# Patient Record
Sex: FEMALE | Race: BLACK OR AFRICAN AMERICAN | NOT HISPANIC OR LATINO | Employment: PART TIME | URBAN - METROPOLITAN AREA
[De-identification: names, ages, dates, MRNs, and addresses within clinical notes are randomized per-mention and may not be internally consistent; named-entity substitution may affect disease eponyms.]

---

## 2017-05-12 ENCOUNTER — OFFICE VISIT (OUTPATIENT)
Dept: OPHTHALMOLOGY | Facility: CLINIC | Age: 32
End: 2017-05-12

## 2017-05-12 DIAGNOSIS — H52.229 REGULAR ASTIGMATISM, UNSPECIFIED EYE: ICD-10-CM

## 2017-05-12 DIAGNOSIS — H53.8 BLURRED VISION, BILATERAL: ICD-10-CM

## 2017-05-12 DIAGNOSIS — H04.123 DRY EYES, BILATERAL: ICD-10-CM

## 2017-05-12 DIAGNOSIS — H52.13 MYOPIA, BILATERAL: Primary | ICD-10-CM

## 2017-05-12 ASSESSMENT — VISUAL ACUITY
OS_SC: 20/150
OS_PH_SC: 20/40
OD_SC: 20/500
METHOD: SNELLEN - LINEAR
OD_PH_SC: 20/70

## 2017-05-12 ASSESSMENT — REFRACTION_MANIFEST
OD_CYLINDER: +3.00
OD_CYLINDER: +3.25
OD_CYLINDER: +1.00
OS_CYLINDER: +1.75
OS_AXIS: 000
OD_SPHERE: -7.00
OS_SPHERE: -3.50
OS_AXIS: 090
OS_SPHERE: -3.00
OD_AXIS: 095
OD_SPHERE: -7.50
OD_AXIS: 105
OD_AXIS: 085
OD_SPHERE: -5.75
OS_AXIS: 080
OS_SPHERE: -3.75
OS_CYLINDER: +1.50
OS_CYLINDER: +0.00

## 2017-05-12 ASSESSMENT — SLIT LAMP EXAM - LIDS
COMMENTS: NORMAL
COMMENTS: NORMAL

## 2017-05-12 ASSESSMENT — CONF VISUAL FIELD
OD_NORMAL: 1
OS_NORMAL: 1

## 2017-05-12 ASSESSMENT — TONOMETRY
IOP_METHOD: TONOPEN
OS_IOP_MMHG: 13
OD_IOP_MMHG: 15

## 2017-05-12 ASSESSMENT — EXTERNAL EXAM - LEFT EYE: OS_EXAM: NORMAL

## 2017-05-12 ASSESSMENT — EXTERNAL EXAM - RIGHT EYE: OD_EXAM: NORMAL

## 2017-05-12 NOTE — MR AVS SNAPSHOT
After Visit Summary   5/12/2017    Indy Erazo    MRN: [id number]           Patient Information     Date Of Birth          1985        Visit Information        Provider Department      5/12/2017 10:00 AM Raysa Vasquez MD [facility]        Today's Diagnoses     Myopia, bilateral    -  1    Regular astigmatism, unspecified eye        Dry eyes, bilateral        Blurred vision, bilateral          Care Instructions    Use artificial tear drops at least three times per day in both eyes for dry eye pain.        Follow-ups after your visit        Your next 10 appointments already scheduled     Jul 18, 2017  9:00 AM CDT   Return Visit with Raysa Vasquez MD   [facility] ([facility])    [facility]  [address] [address] [phone]              Who to contact     Please call your clinic at [phone] to:    Ask questions about your health    Make or cancel appointments    Discuss your medicines    Learn about your test results    Speak to your doctor   If you have compliments or concerns about an experience at your clinic, or if you wish to file a complaint, please contact [facility] Patient Relations at [phone] or email us at [email or web address]         Additional Information About Your Visit        MyChart Information     [employer] is an electronic gateway that provides easy, online access to your medical records. With [employer] Software, you can request a clinic appointment, read your test results, renew a prescription or communicate with your care team.     To sign up for [employer] Gr[employer] visit the website at [email or web address]   You will be asked to enter the access code listed below, as well as some personal information. Please follow the directions to create your username and password.     Your access code is:  [email or web address]  Expires: 8/10/2017 11:53 AM     Your access code will  in 90 days. If you need help or a new code, please contact your [facility] or call [phone] for assistance.        Care EveryWhere ID     This is your Care EveryWhere ID. This could be used by other organizations to access your [facility] medical records  [id number]         Blood Pressure from Last 3 Encounters:   No data found for BP    Weight from Last 3 Encounters:   No data found for Wt              Today, you had the following     No orders found for display       Primary Care Provider    None Specified       No primary provider on file.        Thank you!     Thank you for choosing [facility]  for your care. Our goal is always to provide you with excellent care. Hearing back from our patients is one way we can continue to improve our services. Please take a few minutes to complete the written survey that you may receive in the mail after your visit with us. Thank you!             Your Updated Medication List - Protect others around you: Learn how to safely use, store and throw away your medicines at www.disposemymeds.org.      Notice  As of 2017 11:53 AM    You have not been prescribed any medications.

## 2017-05-12 NOTE — PROGRESS NOTES
HPI  Indy Erazo is a 32 year old female here for comprehensive eye exam.  She lost her glasses before she moved from [place].  Thinks one eye has always been blurrier than the other.  Occasional brief sharp pain in both eyes worse in the evenings.       PMH: none  POH: Glasses in the past, no recollection of patching, no surgery, no trauma  Oc Meds: none  FH: Denies any glaucoma, age related macular degeneration, or other known eye diseases.  Daughter wears glasses.        Assessment & Plan     (H52.13) Myopia, bilateral - Both Eyes  (primary encounter diagnosis)  Comment: anisometropia, retina attached today on dilated fundus exam   Plan: Spherical equivalent anisometropia less than 3.0 D, discussed with patient that if she can't tolerate may need to change right lens a little to balance    (H52.229) Regular astigmatism, unspecified eye - Both Eyes  Comment: topography done, no Keratoconus or irregular astigmatism, high regular wtr astigmatism and anisometropia both eyes   Plan: manifest refraction done and prescription for glasses given with discussion per above    (H04.123) Dry eyes, bilateral - Both Eyes  Comment: mild symptoms, low tear film  Plan: recommend starting artificial tear drops four times a day and as needed     (H53.8) Blurred vision, bilateral - Right Eye  Comment: best corrected visual acuity slightly reduced right eye with higher myopia and astigmatism, possible mild refractive amblyopia, no history of treatment as a child  Plan: observe           -----------------------------------------------------------------------------------       Patient disposition:    Return in about 2 months (around 7/12/2017) for follow up- dry eye please check new glasses in lensometer. To call sooner as needed.    Complete documentation of historical and exam elements from today's encounter can be found in the full encounter summary report (not reduplicated in this progress note). I personally obtained the chief  complaint(s) and history of present illness.  I have confirmed and edited as necessary the CC, HPI, PMH/PSH, social history, FMH, ROS, and exam/neuro findings as obtained by the technician or others. I have examined this patient myself and I personally viewed the image(s) and studies listed above and the documentation reflects my findings and interpretation.

## 2017-05-12 NOTE — NURSING NOTE
Chief Complaints and History of Present Illnesses   Patient presents with     Blurred Vision Both Eyes     HPI    Additional Referring Providers:  Dr. Dennis Newman NP [facility] [place]   Affected eye(s):  Both   Symptoms:     Decreased vision   Redness   Tearing   Burning      Duration:  2 years   Frequency:  Constant, Daily       Do you have eye pain now?:  Yes   Location:  OU   Unknown:  Duration unknown   Pain Frequency:  Intermittent   Pain Characteristics:  Sharp/Quick      Comments:  Sent here at request of Mireya Newman NP [facility] [place]. Pt complains of blurry vision at distance, hard to focus. Lost or has not worn glasses last 2 years.  Some pain and discomfort, quick and sharp pains from time to time. DYLON IVEY, COA 11:08 AM 05/12/2017

## 2017-05-14 ASSESSMENT — CUP TO DISC RATIO
OD_RATIO: 0.3
OS_RATIO: 0.3

## 2017-07-18 ENCOUNTER — OFFICE VISIT (OUTPATIENT)
Dept: OPHTHALMOLOGY | Facility: CLINIC | Age: 32
End: 2017-07-18

## 2017-07-18 DIAGNOSIS — H53.8 BLURRED VISION, BILATERAL: ICD-10-CM

## 2017-07-18 DIAGNOSIS — H52.13 MYOPIA, BILATERAL: Primary | ICD-10-CM

## 2017-07-18 DIAGNOSIS — H04.123 DRY EYES, BILATERAL: ICD-10-CM

## 2017-07-18 ASSESSMENT — VISUAL ACUITY
OS_CC: 20/30
OD_CC: 20/50
CORRECTION_TYPE: GLASSES
METHOD: SNELLEN - LINEAR

## 2017-07-18 ASSESSMENT — CONF VISUAL FIELD
OD_NORMAL: 1
OS_NORMAL: 1

## 2017-07-18 ASSESSMENT — REFRACTION_WEARINGRX
OD_AXIS: 082
SPECS_TYPE: SVL
OS_CYLINDER: +1.75
OD_CYLINDER: +3.00
OS_SPHERE: -3.50
OS_AXIS: 080
OD_SPHERE: -7.00

## 2017-07-18 ASSESSMENT — CUP TO DISC RATIO
OS_RATIO: 0.3
OD_RATIO: 0.3

## 2017-07-18 ASSESSMENT — SLIT LAMP EXAM - LIDS
COMMENTS: NORMAL
COMMENTS: NORMAL

## 2017-07-18 ASSESSMENT — EXTERNAL EXAM - LEFT EYE: OS_EXAM: NORMAL

## 2017-07-18 ASSESSMENT — EXTERNAL EXAM - RIGHT EYE: OD_EXAM: NORMAL

## 2017-07-18 NOTE — NURSING NOTE
Chief Complaints and History of Present Illnesses   Patient presents with     Dry Eye Syndrome Follow Up     HPI    Affected eye(s):  Both   Symptoms:     No decreased vision   Dryness (Comment: mild)            Comments:  Dry eye recheck.  Va now better with PG, but now has headaches since wearing them.    Tonia FISCHER 10:48 AM 07/18/2017

## 2017-07-18 NOTE — MR AVS SNAPSHOT
After Visit Summary   2017    Indy Erazo    MRN: [id number]           Patient Information     Date Of Birth          1985        Visit Information        Provider Department      2017 10:00 AM Raysa Vasquez MD [facility]        Today's Diagnoses     Myopia, bilateral - Both Eyes    -  1    Dry eyes, bilateral - Both Eyes        Blurred vision, bilateral - Right Eye           Follow-ups after your visit        Follow-up notes from your care team     Return in about 1 month (around 2017) for follow up-  dr gerry love.      Who to contact     Please call your clinic at [phone] to:    Ask questions about your health    Make or cancel appointments    Discuss your medicines    Learn about your test results    Speak to your doctor   If you have compliments or concerns about an experience at your clinic, or if you wish to file a complaint, please contact [facility] Patient Relations at [phone] or email us at [email or web address]         Additional Information About Your Visit        MyChart Information     [employer] is an electronic gateway that provides easy, online access to your medical records. With [employer], you can request a clinic appointment, read your test results, renew a prescription or communicate with your care team.     To sign up for [employer] visit the website at [email or web address]   You will be asked to enter the access code listed below, as well as some personal information. Please follow the directions to create your username and password.     Your access code is: [email or web address]  Expires: 8/10/2017 11:53 AM     Your access code will  in 90 days. If you need help or a new code, please contact your [facility] or call [phone] for assistance.        Care EveryWhere ID     This is your Care EveryWhere ID. This could be used by other  organizations to access your [facility] medical records  [id number]         Blood Pressure from Last 3 Encounters:   No data found for BP    Weight from Last 3 Encounters:   No data found for Wt              Today, you had the following     No orders found for display       Primary Care Provider    None Specified       No primary provider on file.        Equal Access to Services     KEERTHIMARITA OSWALDO : Hadii aad molly hadbladeo Sojacobali, waaxda luqadaha, qaybta kaalmada adeegyada, beata peguero edilmamilagros davidsebcharlee kaufman . So [facility] [phone].    ATENCIÓN: Si habla español, tiene a pizarro disposición servicios gratuitos de asistencia lingüística. Llame al [phone].    We comply with applicable federal civil rights laws and [place] laws. We do not discriminate on the basis of race, color, national origin, age, disability sex, sexual orientation or gender identity.            Thank you!     Thank you for choosing [facility]  for your care. Our goal is always to provide you with excellent care. Hearing back from our patients is one way we can continue to improve our services. Please take a few minutes to complete the written survey that you may receive in the mail after your visit with us. Thank you!             Your Updated Medication List - Protect others around you: Learn how to safely use, store and throw away your medicines at www.disposemymeds.org.      Notice  As of 7/18/2017 12:10 PM    You have not been prescribed any medications.

## 2017-07-18 NOTE — PROGRESS NOTES
JHONATAN  Indy Erazo is a 32 year old female here for follow-up after getting new glasses for anisometropia and astigmatism both eyes.    She lost her glasses before she moved from [place], she says those glasses helped her far distance visual acuity more than the current glasses.  She has longstanding headache but thinks that she is getting a few more with the new glasses- ?strain?.   She does think one eye has always been blurrier than the other.  The occasional brief sharp pain in both eyes worse in the evenings is improved now after using artificial tear drops since last visit.       PMH: none  POH: Glasses in the past, no recollection of patching, no surgery, no trauma  Oc Meds: none  FH: Denies any glaucoma, age related macular degeneration, or other known eye diseases.  Daughter wears glasses.        Assessment & Plan     (H52.13) Myopia, bilateral - Both Eyes  (primary encounter diagnosis)  Comment: anisometropia, glasses made correctly, visual acuity not as good as last visit.  Spherical equivalent given last time with anisometropia less than 3.0 D but unsure if she is having headache due to the glasses as she has chronic intermittent headache.  Discussed with patient that if she can't tolerate these glasses and still not seeing well in distance she may benefit from re-refraction with Dr. Umanzor or contact lens trial to see if we can get better best corrected visual acuity.  May also need to change right lens a little to balance as we discussed previously.    (H52.229) Regular astigmatism, unspecified eye - Both Eyes  Comment: topography done prior, no Keratoconus or irregular astigmatism, high regular wtr astigmatism and anisometropia both eyes   Plan: per above    (H04.123) Dry eyes, bilateral - Both Eyes  Comment: mild symptoms, low tear film  Plan: improved symptoms with artificial tear drops, continue    (H53.8) Blurred vision, bilateral -   Comment: best corrected visual acuity slightly reduced right eye  with higher myopia and astigmatism, possible mild refractive amblyopia, no history of treatment as a child  Unclear why worse visual acuity left eye today as well-   Plan: try another refraction with CZ per above          -----------------------------------------------------------------------------------       Patient disposition:    Return in about 1 month (around 8/18/2017) for follow up-  dr garrett re-refraction with possible ctl eval. To call sooner as needed.    Complete documentation of historical and exam elements from today's encounter can be found in the full encounter summary report (not reduplicated in this progress note). I personally obtained the chief complaint(s) and history of present illness.  I have confirmed and edited as necessary the CC, HPI, PMH/PSH, social history, FMH, ROS, and exam/neuro findings as obtained by the technician or others. I have examined this patient myself and I personally viewed the image(s) and studies listed above and the documentation reflects my findings and interpretation.

## 2017-08-01 ENCOUNTER — OFFICE VISIT (OUTPATIENT)
Dept: OPTOMETRY | Facility: CLINIC | Age: 32
End: 2017-08-01

## 2017-08-01 DIAGNOSIS — H52.31 ANISOMETROPIA AND ANISEIKONIA: Primary | ICD-10-CM

## 2017-08-01 DIAGNOSIS — H52.203 MYOPIA WITH ASTIGMATISM, BILATERAL: ICD-10-CM

## 2017-08-01 DIAGNOSIS — H52.13 MYOPIA WITH ASTIGMATISM, BILATERAL: ICD-10-CM

## 2017-08-01 DIAGNOSIS — H52.32 ANISOMETROPIA AND ANISEIKONIA: Primary | ICD-10-CM

## 2017-08-01 ASSESSMENT — REFRACTION_WEARINGRX
OS_SPHERE: -3.50
OD_AXIS: 085
OD_CYLINDER: +3.00
OD_SPHERE: -7.00
OS_CYLINDER: +1.75
OS_AXIS: 080

## 2017-08-01 ASSESSMENT — REFRACTION_CURRENTRX
OD_BRAND: BIOFINITY TORIC
OD_CYLINDER: -2.25
OS_BASECURVE: 8.7
OD_AXIS: 180
OS_AXIS: 180
OD_DIAMETER: 14.5
OD_SPHERE: -4.00
OS_SPHERE: -2.00
OS_CYLINDER: -1.25
OS_BRAND: BIOFINITY TORIC
OS_DIAMETER: 14.5
OD_BASECURVE: 8.7

## 2017-08-01 ASSESSMENT — KERATOMETRY
OD_K2POWER_DIOPTERS: 46.75
OS_K2POWER_DIOPTERS: 46.30
OS_K1POWER_DIOPTERS: 44.82
OD_AXISANGLE_DEGREES: 94
OS_AXISANGLE_DEGREES: 82
OD_K1POWER_DIOPTERS: 43.95
OD_AXISANGLE2_DEGREES: 4
METHOD_AUTO_MANUAL: TOPO
OS_AXISANGLE2_DEGREES: 172

## 2017-08-01 ASSESSMENT — SLIT LAMP EXAM - LIDS
COMMENTS: NORMAL
COMMENTS: NORMAL

## 2017-08-01 ASSESSMENT — VISUAL ACUITY
VA_OR_OD_CURRENT_RX: 20/25+2
METHOD: SNELLEN - LINEAR
VA_OR_OS_CURRENT_RX: 20/20
OD_CC: 20/30-1
OS_CC: 20/20-1
CORRECTION_TYPE: GLASSES

## 2017-08-01 ASSESSMENT — EXTERNAL EXAM - RIGHT EYE: OD_EXAM: NORMAL

## 2017-08-01 ASSESSMENT — EXTERNAL EXAM - LEFT EYE: OS_EXAM: NORMAL

## 2017-08-01 NOTE — MR AVS SNAPSHOT
After Visit Summary   2017    Indy Erazo    MRN: [id number]           Patient Information     Date Of Birth          1985        Visit Information        Provider Department      2017 8:45 AM Ariella Umanzor OD; [facility]         Follow-ups after your visit        Your next 10 appointments already scheduled     Aug 10, 2017  9:00 AM CDT   (Arrive by 8:45 AM)   RETURN GENERAL with Ariella Umanzor OD   [facility] ([facility])    [address] [place] [address] [phone]              Who to contact     Please call your clinic at [phone] to:    Ask questions about your health    Make or cancel appointments    Discuss your medicines    Learn about your test results    Speak to your doctor   If you have compliments or concerns about an experience at your clinic, or if you wish to file a complaint, please contact [facility] Patient Relations at [phone] or email us at [email or web address]         Additional Information About Your Visit        MyChart Information     [employer] is an electronic gateway that provides easy, online access to your medical records. With [employer], you can request a clinic appointment, read your test results, renew a prescription or communicate with your care team.     To sign up for [employer] visit the website at [email or web address]   You will be asked to enter the access code listed below, as well as some personal information. Please follow the directions to create your username and password.     Your access code is: [email or web address]  Expires: 8/10/2017 11:53 AM     Your access code will  in 90 days. If you need help or a new code, please contact your [facility] or call [phone] for assistance.        Care EveryWhere ID     This is your Care EveryWhere ID. This could be used by other  organizations to access your [facility] medical records  [id number]         Blood Pressure from Last 3 Encounters:   No data found for BP    Weight from Last 3 Encounters:   No data found for Wt              Today, you had the following     No orders found for display       Primary Care Provider    None Specified       No primary provider on file.        Equal Access to Services     KEERTHIMARITA OSWALDO : Hadii aad molly hadbladeo Sojacobali, waaxda luqadaha, qaybta kaalmada adeegyada, beata peguero edilmamilagros mcgee tishcharlee kaufman . So [facility] [phone].    ATENCIÓN: Si habla español, tiene a pizarro disposición servicios gratuitos de asistencia lingüística. Llame al [phone].    We comply with applicable federal civil rights laws and [place] laws. We do not discriminate on the basis of race, color, national origin, age, disability sex, sexual orientation or gender identity.            Thank you!     Thank you for choosing [facility]  for your care. Our goal is always to provide you with excellent care. Hearing back from our patients is one way we can continue to improve our services. Please take a few minutes to complete the written survey that you may receive in the mail after your visit with us. Thank you!             Your Updated Medication List - Protect others around you: Learn how to safely use, store and throw away your medicines at www.disposemymeds.org.      Notice  As of 8/1/2017 10:21 AM    You have not been prescribed any medications.

## 2017-08-01 NOTE — PROGRESS NOTES
A/P  1.) Anisometropia/Aniseikonia OU  -C/o blur, discomfort with glasses Rx  -High regular WTR cyl OD>OS  -Good vision/comfort/fit with Biofinity Toric soft lenses, improved visual comfort  -Possible mild amblyopia OD    Order trial lenses, RTC 1-2 weeks for lens dispense, I&R    I have confirmed the patient's CC, HPI and reviewed Past Medical History, Past Surgical History, Social History, Family History, Problem List, Medication List and agree with Tech note.     Ariella Umanzor, OD FAAO

## 2017-08-10 ENCOUNTER — OFFICE VISIT (OUTPATIENT)
Dept: OPHTHALMOLOGY | Facility: CLINIC | Age: 32
End: 2017-08-10

## 2017-08-10 DIAGNOSIS — H52.31 ANISOMETROPIA AND ANISEIKONIA: Primary | ICD-10-CM

## 2017-08-10 DIAGNOSIS — H52.203 MYOPIA WITH ASTIGMATISM, BILATERAL: ICD-10-CM

## 2017-08-10 DIAGNOSIS — H52.32 ANISOMETROPIA AND ANISEIKONIA: Primary | ICD-10-CM

## 2017-08-10 DIAGNOSIS — H52.13 MYOPIA WITH ASTIGMATISM, BILATERAL: ICD-10-CM

## 2017-08-10 ASSESSMENT — REFRACTION_CURRENTRX
OD_BRAND: BIOFINITY TORIC
OD_BASECURVE: 8.7
OS_DIAMETER: 14.5
OS_BRAND: BIOFINITY TORIC
OS_SPHERE: -2.00
OS_BASECURVE: 8.7
OD_DIAMETER: 14.5
OD_AXIS: 180
OS_AXIS: 180
OD_SPHERE: -4.00
OS_CYLINDER: -1.25
OD_CYLINDER: -2.25

## 2017-08-10 ASSESSMENT — VISUAL ACUITY
OD_CC: 20/20
CORRECTION_TYPE: CONTACTS
OD_CC+: -1
METHOD: SNELLEN - LINEAR
OS_CC: 20/25+

## 2017-08-10 NOTE — MR AVS SNAPSHOT
After Visit Summary   8/10/2017    Indy Erazo    MRN: [id number]           Patient Information     Date Of Birth          1985        Visit Information        Provider Department      8/10/2017 8:45 AM Ariella Umanzor, [facility]; [employer] [facility]        Today's Diagnoses     Anisometropia and aniseikonia    -  1    Myopia with astigmatism, bilateral           Follow-ups after your visit        Your next 10 appointments already scheduled     Aug 17, 2017 11:30 AM CDT   (Arrive by 11:15 AM)   TECH with [facility] ([facility])    [address] [place] [address] [phone]              Who to contact     Please call your clinic at [phone] to:    Ask questions about your health    Make or cancel appointments    Discuss your medicines    Learn about your test results    Speak to your doctor   If you have compliments or concerns about an experience at your clinic, or if you wish to file a complaint, please contact [facility] Patient Relations at [phone] or email us at [email or web address]         Additional Information About Your Visit        MyChart Information     Commtim[employer]et is an electronic gateway that provides easy, online access to your medical records. With Golden Reviews, you can request a clinic appointment, read your test results, renew a prescription or communicate with your care team.     To sign up for Com[employer]et visit the website at [email or web address]   You will be asked to enter the access code listed below, as well as some personal information. Please follow the directions to create your username and password.     Your access code is: [email or web address]  Expires: 2017  6:31 AM     Your access code will  in 90 days. If you need help or a new code, please contact your [facility] or call [phone] for  assistance.        Care EveryWhere ID     This is your Care EveryWhere ID. This could be used by other organizations to access your [place] medical records  [id number]         Blood Pressure from Last 3 Encounters:   No data found for BP    Weight from Last 3 Encounters:   No data found for Wt              Today, you had the following     No orders found for display       Primary Care Provider    None Specified       No primary provider on file.        Equal Access to Services     [facility]: Hadii aad ku hadasho Sojacobali, waaxda luqadaha, qaybta kaalmada adekristopheryada, waxay idiin hayjagdeepn adekristopher davidsebcharlee kaufman . So [facility] [phone].    ATENCIÓN: Si habla español, tiene a pizarro disposición servicios gratuitos de asistencia lingüística. Sharonaame al [phone].    We comply with applicable federal civil rights laws and [place] laws. We do not discriminate on the basis of race, color, national origin, age, disability sex, sexual orientation or gender identity.            Thank you!     Thank you for choosing [facility]  for your care. Our goal is always to provide you with excellent care. Hearing back from our patients is one way we can continue to improve our services. Please take a few minutes to complete the written survey that you may receive in the mail after your visit with us. Thank you!             Your Updated Medication List - Protect others around you: Learn how to safely use, store and throw away your medicines at www.disposemymeds.org.      Notice  As of 8/10/2017 11:59 PM    You have not been prescribed any medications.

## 2017-08-11 NOTE — PROGRESS NOTES
A/P  1.) Anisometropia/Aniseikonia OU  -C/o blur, discomfort with glasses Rx  -High regular WTR cyl OD>OS  -Good vision/comfort/fit with Biofinity Toric soft lenses, improved visual comfort  -Unsuccessful I&R today - no lenses dispensed    RTC next available for continued I&R    I have confirmed the patient's CC, HPI and reviewed Past Medical History, Past Surgical History, Social History, Family History, Problem List, Medication List and agree with Tech note.     Ariella Umanzor, OD FAAO

## 2019-09-11 ENCOUNTER — HOSPITAL ENCOUNTER (EMERGENCY)
Facility: CLINIC | Age: 34
Discharge: HOME | End: 2019-09-12
Attending: EMERGENCY MEDICINE | Admitting: EMERGENCY MEDICINE
Payer: COMMERCIAL

## 2019-09-11 DIAGNOSIS — R10.9 RECURRENT ABDOMINAL PAIN: ICD-10-CM

## 2019-09-11 LAB
ALBUMIN UR-MCNC: 10 MG/DL
APPEARANCE UR: ABNORMAL
BILIRUB UR QL STRIP: NEGATIVE
COLOR UR AUTO: YELLOW
GLUCOSE UR STRIP-MCNC: 70 MG/DL
HGB UR QL STRIP: NEGATIVE
KETONES UR STRIP-MCNC: NEGATIVE MG/DL
LEUKOCYTE ESTERASE UR QL STRIP: ABNORMAL
MUCOUS THREADS #/AREA URNS LPF: PRESENT /LPF
NITRATE UR QL: NEGATIVE
PH UR STRIP: 5.5 PH (ref 5–7)
RBC #/AREA URNS AUTO: <1 /HPF (ref 0–2)
SOURCE: ABNORMAL
SP GR UR STRIP: 1.03 (ref 1–1.03)
SQUAMOUS #/AREA URNS AUTO: 2 /HPF (ref 0–1)
UROBILINOGEN UR STRIP-MCNC: 2 MG/DL (ref 0–2)
WBC #/AREA URNS AUTO: 4 /HPF (ref 0–5)

## 2019-09-11 PROCEDURE — 87086 URINE CULTURE/COLONY COUNT: CPT | Performed by: EMERGENCY MEDICINE

## 2019-09-11 PROCEDURE — 81001 URINALYSIS AUTO W/SCOPE: CPT | Performed by: EMERGENCY MEDICINE

## 2019-09-11 PROCEDURE — 99284 EMERGENCY DEPT VISIT MOD MDM: CPT | Mod: 25

## 2019-09-11 ASSESSMENT — ENCOUNTER SYMPTOMS
ABDOMINAL PAIN: 1
CONSTIPATION: 0
DIARRHEA: 0
DYSURIA: 0
FREQUENCY: 1

## 2019-09-11 NOTE — ED AVS SNAPSHOT
Emergency Department  [address] [place] [address]  Phone:  [phone]  Fax:  [phone]                                    Indy Erazo   MRN: [id number]    Department:   Emergency Department   Date of Visit:  9/11/2019           After Visit Summary Signature Page    I have received my discharge instructions, and my questions have been answered. I have discussed any challenges I see with this plan with the nurse or doctor.    ..........................................................................................................................................  Patient/Patient Representative Signature      ..........................................................................................................................................  Patient Representative Print Name and Relationship to Patient    ..................................................               ................................................  Date                                   Time    ..........................................................................................................................................  Reviewed by Signature/Title    ...................................................              ..............................................  Date                                               Time          22EPIC Rev 08/18

## 2019-09-12 ENCOUNTER — APPOINTMENT (OUTPATIENT)
Dept: ULTRASOUND IMAGING | Facility: CLINIC | Age: 34
End: 2019-09-12
Attending: EMERGENCY MEDICINE
Payer: COMMERCIAL

## 2019-09-12 VITALS
RESPIRATION RATE: 16 BRPM | TEMPERATURE: 97.9 F | OXYGEN SATURATION: 98 % | HEART RATE: 72 BPM | SYSTOLIC BLOOD PRESSURE: 107 MMHG | DIASTOLIC BLOOD PRESSURE: 75 MMHG

## 2019-09-12 LAB
ALBUMIN SERPL-MCNC: 3.3 G/DL (ref 3.4–5)
ALP SERPL-CCNC: 71 U/L (ref 40–150)
ALT SERPL W P-5'-P-CCNC: 17 U/L (ref 0–50)
ANION GAP SERPL CALCULATED.3IONS-SCNC: 7 MMOL/L (ref 3–14)
AST SERPL W P-5'-P-CCNC: 15 U/L (ref 0–45)
B-HCG FREE SERPL-ACNC: <5 IU/L
BASOPHILS # BLD AUTO: 0 10E9/L (ref 0–0.2)
BASOPHILS NFR BLD AUTO: 0.4 %
BILIRUB SERPL-MCNC: 0.2 MG/DL (ref 0.2–1.3)
BUN SERPL-MCNC: 9 MG/DL (ref 7–30)
CALCIUM SERPL-MCNC: 8.5 MG/DL (ref 8.5–10.1)
CHLORIDE SERPL-SCNC: 109 MMOL/L (ref 94–109)
CO2 SERPL-SCNC: 24 MMOL/L (ref 20–32)
CREAT SERPL-MCNC: 0.52 MG/DL (ref 0.52–1.04)
DIFFERENTIAL METHOD BLD: ABNORMAL
EOSINOPHIL # BLD AUTO: 0.1 10E9/L (ref 0–0.7)
EOSINOPHIL NFR BLD AUTO: 2 %
ERYTHROCYTE [DISTWIDTH] IN BLOOD BY AUTOMATED COUNT: 15.3 % (ref 10–15)
GFR SERPL CREATININE-BSD FRML MDRD: >90 ML/MIN/{1.73_M2}
GLUCOSE SERPL-MCNC: 176 MG/DL (ref 70–99)
HCT VFR BLD AUTO: 33.3 % (ref 35–47)
HGB BLD-MCNC: 10.7 G/DL (ref 11.7–15.7)
IMM GRANULOCYTES # BLD: 0 10E9/L (ref 0–0.4)
IMM GRANULOCYTES NFR BLD: 0.2 %
LIPASE SERPL-CCNC: 150 U/L (ref 73–393)
LYMPHOCYTES # BLD AUTO: 2.4 10E9/L (ref 0.8–5.3)
LYMPHOCYTES NFR BLD AUTO: 47.6 %
MCH RBC QN AUTO: 24.5 PG (ref 26.5–33)
MCHC RBC AUTO-ENTMCNC: 32.1 G/DL (ref 31.5–36.5)
MCV RBC AUTO: 76 FL (ref 78–100)
MONOCYTES # BLD AUTO: 0.4 10E9/L (ref 0–1.3)
MONOCYTES NFR BLD AUTO: 7.9 %
NEUTROPHILS # BLD AUTO: 2.1 10E9/L (ref 1.6–8.3)
NEUTROPHILS NFR BLD AUTO: 41.9 %
NRBC # BLD AUTO: 0 10*3/UL
NRBC BLD AUTO-RTO: 0 /100
PLATELET # BLD AUTO: 334 10E9/L (ref 150–450)
POTASSIUM SERPL-SCNC: 3.4 MMOL/L (ref 3.4–5.3)
PROT SERPL-MCNC: 7.4 G/DL (ref 6.8–8.8)
RBC # BLD AUTO: 4.36 10E12/L (ref 3.8–5.2)
SODIUM SERPL-SCNC: 140 MMOL/L (ref 133–144)
WBC # BLD AUTO: 5 10E9/L (ref 4–11)

## 2019-09-12 PROCEDURE — 85025 COMPLETE CBC W/AUTO DIFF WBC: CPT | Performed by: EMERGENCY MEDICINE

## 2019-09-12 PROCEDURE — 84702 CHORIONIC GONADOTROPIN TEST: CPT

## 2019-09-12 PROCEDURE — 83690 ASSAY OF LIPASE: CPT | Performed by: EMERGENCY MEDICINE

## 2019-09-12 PROCEDURE — 76705 ECHO EXAM OF ABDOMEN: CPT

## 2019-09-12 PROCEDURE — 80053 COMPREHEN METABOLIC PANEL: CPT | Performed by: EMERGENCY MEDICINE

## 2019-09-12 ASSESSMENT — ENCOUNTER SYMPTOMS
CHEST TIGHTNESS: 0
FEVER: 0
SHORTNESS OF BREATH: 0
COUGH: 0
VOMITING: 0

## 2019-09-12 NOTE — ED TRIAGE NOTES
[place].  Was pushing a wheelchair and felt that abd pain came on suddenly, making it difficult to walk.  Has had this pain previously this last month.

## 2019-09-12 NOTE — ED PROVIDER NOTES
History     Chief Complaint:  Abdominal Pain    The history is limited by a language barrier. A  was used.      Indy Erazo is a 34 year old female who presents with upper right abdominal pain [place]. Patient was pushing a passenger in a wheelchair when she had an onset of sudden onset of abdominal pain. Patient had difficulty ambulating while there due to the pain. She notes that she has had this pain before and it happens once a month and lasts for about a week. The pain does not occur at about the same time each month. She tried using ibuprofen last week but did not take it this week because she states she does not eat as much as she should in order to take it. Patient became concerned about the pain being present again tonight, prompting her to call EMS who transported the patient to the ED. Here, patient also has increased urinary frequency. No vaginal bleeding, dysuria, diarrhea, and constipation.    Of note, patient had a pelvic ultrasound and lumbar spine x-ray earlier this week that were negative for significant findings.     US Pelvis on 9/5/2019:  Unremarkable sonographic appearance of the pelvis.    XR Lumbar Spine, 2-3 views on 9/6/2019:  No fracture or subluxation of the lumbosacral vertebral bodies is identified.  Vertebral disk space height and alignment appear normal.    Allergies:  NKDA     Medications:    The patient is not currently taking any prescribed medications.    Past Medical History:    H. Pylori infection  Liver hemangioma  Thyroid nodule  Decreased visual acuity  Dental neglect  Illiterate   Language barrier  Obese body habitus    Past Surgical History:    The patient does not have any pertinent past surgical history.    Family History:  No past pertinent family history.    Social History:  Negative for tobacco use.  Negative for alcohol use.  Negative for drug use.  Marital Status:  .     Review of Systems    Constitutional: Negative for fever.   Respiratory: Negative for cough, chest tightness and shortness of breath.    Cardiovascular: Negative for chest pain.   Gastrointestinal: Positive for abdominal pain (Upper right). Negative for constipation, diarrhea and vomiting.   Genitourinary: Positive for frequency. Negative for dysuria and vaginal bleeding.   All other systems reviewed and are negative.        Physical Exam     Patient Vitals for the past 24 hrs:   BP Temp Pulse Heart Rate Resp SpO2   09/12/19 0001 114/72 97.9  F (36.6  C) -- -- -- --   09/11/19 2336 -- -- 79 79 16 100 %     Physical Exam  General: Appears well-developed and well-nourished.   Head: No signs of trauma.   CV: Normal rate and regular rhythm.    Resp: Effort normal and breath sounds normal. No respiratory distress.   GI: Soft. There is minimal epigastric tenderness.  No rebound or guarding.  Normal bowel sounds.  No CVA tenderness.  MSK: Normal range of motion.   Neuro: The patient is alert and oriented.  Speech normal.  GCS 15  Skin: Skin is warm and dry. No rash noted.   Psych: normal mood and affect. behavior is normal.       Emergency Department Course   Imaging:  Radiographic findings were communicated with the patient who voiced understanding of the findings.    Abdomen US, limited (RUQ only)   Final Result   IMPRESSION:    1. Unremarkable appearance of the gallbladder.   2. No biliary dilatation.   3. 5 cm heterogeneous hyperechoic mass in the right lobe of the liver.   This is nonspecific, but most likely represents a hemangioma.   Recommend comparison with prior imaging studies, if available. If not   available, an MR of the liver could be considered for further   evaluation.      ZBIGNIEW KENNEDY MD        Laboratory:  CBC: WBC: 5.0, HGB: 10.7 (L), PLT: 334  CMP: Glucose 176 (H), Albumin 3.3 (L), o/w WNL (Creatinine: 0.52)  Lipase: 150  0005 ISTAT HCG quantitative pregnancy POCT: <5.0     UA with micro: glucose 70, protein albumin  10, leukocyte esterase moderate, squamous epithelial 2 (H), mucous present, o/w negative    Emergency Department Course:  2334 Nursing notes and vitals reviewed. I performed an exam of the patient as documented above.     Blood drawn. This was sent to the lab for further testing, results above. The patient provided a urine sample here in the emergency department. This was sent for laboratory testing, findings above.     The patient was sent for an abdomen US while in the emergency department, findings above.     0132 I rechecked the patient and discussed the results of her workup thus far.     Findings and plan explained to the Patient. Patient discharged home with instructions regarding supportive care, medications, and reasons to return. The importance of close follow-up was reviewed.     I personally reviewed the laboratory results with the Patient and answered all related questions prior to discharge.     Impression & Plan    Medical Decision Making:  Indy Erazo is a 34 year old female who presents due to abdominal pain.  It seems that she has gotten this pain recurrently typically in the middle of each month.  She has seen a gynecologist and primary care doctor who have done work-up without a clear etiology.  On my evaluation her abdominal exam was overall fairly benign.  She reports some slight tenderness in the epigastric region with palpation.  Blood work was obtained that was non-concerning.  I did do a right upper quadrant ultrasound which did not show any signs of acute biliary disease.  I did discuss the potential hemangioma with the patient and recommended follow-up.  Given the negative work-up, felt the patient was appropriate for discharge home recommend that she continue to work with her primary care doctor and gynecologist.  Given the symptoms seem to be on a more monthly basis, I did recommend that she discuss with her gynecologist whether birth control pills could be of benefit to her or  not.  Critical Care time:  none    Diagnosis:    ICD-10-CM    1. Recurrent abdominal pain R10.9        Disposition:  discharged to home    Scribe Disposition  I, Zita Gonzalez, am serving as a scribe on 9/11/2019 at 11:35 PM to personally document services performed by Lalo Cheung MD based on my observations and the provider's statements to me.     Zita Gonzalez  9/11/2019    EMERGENCY DEPARTMENT       Lalo Cheung MD  09/12/19 0600

## 2019-09-12 NOTE — ED NOTES
Bed: ED30  Expected date: 9/11/19  Expected time: 11:15 PM  Means of arrival: Ambulance  Comments:  Berna [address] 34F abd. pain

## 2019-09-13 LAB
BACTERIA SPEC CULT: NORMAL
Lab: NORMAL
SPECIMEN SOURCE: NORMAL

## 2019-09-13 NOTE — RESULT ENCOUNTER NOTE
Final urine culture report is NEGATIVE per [facility] ED Lab Result protocol.    If NEGATIVE result, no change in treatment, per [facility] ED Lab Result protocol.

## 2019-10-25 ENCOUNTER — NURSE TRIAGE (OUTPATIENT)
Dept: NURSING | Facility: CLINIC | Age: 34
End: 2019-10-25

## 2019-10-25 NOTE — TELEPHONE ENCOUNTER
Patient attempting to reach [facility] Office to report insurance information.    Caller verbalized understanding. Denies further questions.    Janelle Benoit RN  [facility]

## 2020-01-31 ENCOUNTER — APPOINTMENT (OUTPATIENT)
Dept: CT IMAGING | Facility: CLINIC | Age: 35
End: 2020-01-31
Attending: EMERGENCY MEDICINE
Payer: COMMERCIAL

## 2020-01-31 ENCOUNTER — HOSPITAL ENCOUNTER (INPATIENT)
Facility: CLINIC | Age: 35
LOS: 3 days | Discharge: HOME | End: 2020-02-03
Attending: EMERGENCY MEDICINE | Admitting: HOSPITALIST
Payer: COMMERCIAL

## 2020-01-31 ENCOUNTER — APPOINTMENT (OUTPATIENT)
Dept: MRI IMAGING | Facility: CLINIC | Age: 35
End: 2020-01-31
Attending: HOSPITALIST
Payer: COMMERCIAL

## 2020-01-31 DIAGNOSIS — R41.89 UNRESPONSIVE: ICD-10-CM

## 2020-01-31 PROBLEM — R56.9 SEIZURE (H): Status: ACTIVE | Noted: 2020-01-31

## 2020-01-31 LAB
ALBUMIN SERPL-MCNC: 3.3 G/DL (ref 3.4–5)
ALBUMIN UR-MCNC: NEGATIVE MG/DL
ALP SERPL-CCNC: 73 U/L (ref 40–150)
ALT SERPL W P-5'-P-CCNC: 17 U/L (ref 0–50)
AMPHETAMINES UR QL SCN: NEGATIVE
ANION GAP SERPL CALCULATED.3IONS-SCNC: 4 MMOL/L (ref 3–14)
APPEARANCE UR: CLEAR
AST SERPL W P-5'-P-CCNC: 10 U/L (ref 0–45)
BARBITURATES UR QL: NEGATIVE
BASOPHILS # BLD AUTO: 0 10E9/L (ref 0–0.2)
BASOPHILS NFR BLD AUTO: 0.5 %
BENZODIAZ UR QL: POSITIVE
BILIRUB SERPL-MCNC: 0.2 MG/DL (ref 0.2–1.3)
BILIRUB UR QL STRIP: NEGATIVE
BUN SERPL-MCNC: 9 MG/DL (ref 7–30)
CALCIUM SERPL-MCNC: 8.6 MG/DL (ref 8.5–10.1)
CANNABINOIDS UR QL SCN: NEGATIVE
CHLORIDE SERPL-SCNC: 109 MMOL/L (ref 94–109)
CK SERPL-CCNC: 104 U/L (ref 30–225)
CO2 SERPL-SCNC: 26 MMOL/L (ref 20–32)
COCAINE UR QL: NEGATIVE
COLOR UR AUTO: YELLOW
CREAT SERPL-MCNC: 0.6 MG/DL (ref 0.52–1.04)
DIFFERENTIAL METHOD BLD: ABNORMAL
EOSINOPHIL # BLD AUTO: 0.2 10E9/L (ref 0–0.7)
EOSINOPHIL NFR BLD AUTO: 2.7 %
ERYTHROCYTE [DISTWIDTH] IN BLOOD BY AUTOMATED COUNT: 15.8 % (ref 10–15)
ETHANOL SERPL-MCNC: <0.01 G/DL
GFR SERPL CREATININE-BSD FRML MDRD: >90 ML/MIN/{1.73_M2}
GLUCOSE BLDC GLUCOMTR-MCNC: 89 MG/DL (ref 70–99)
GLUCOSE SERPL-MCNC: 187 MG/DL (ref 70–99)
GLUCOSE UR STRIP-MCNC: 30 MG/DL
HCG SERPL QL: NEGATIVE
HCT VFR BLD AUTO: 31.4 % (ref 35–47)
HGB BLD-MCNC: 9.8 G/DL (ref 11.7–15.7)
HGB UR QL STRIP: NEGATIVE
IMM GRANULOCYTES # BLD: 0 10E9/L (ref 0–0.4)
IMM GRANULOCYTES NFR BLD: 0.2 %
KETONES UR STRIP-MCNC: NEGATIVE MG/DL
LACTATE BLD-SCNC: 1.8 MMOL/L (ref 0.7–2)
LEUKOCYTE ESTERASE UR QL STRIP: NEGATIVE
LYMPHOCYTES # BLD AUTO: 2.3 10E9/L (ref 0.8–5.3)
LYMPHOCYTES NFR BLD AUTO: 41.4 %
MAGNESIUM SERPL-MCNC: 2.1 MG/DL (ref 1.6–2.3)
MCH RBC QN AUTO: 22.7 PG (ref 26.5–33)
MCHC RBC AUTO-ENTMCNC: 31.2 G/DL (ref 31.5–36.5)
MCV RBC AUTO: 73 FL (ref 78–100)
MONOCYTES # BLD AUTO: 0.5 10E9/L (ref 0–1.3)
MONOCYTES NFR BLD AUTO: 8.7 %
MUCOUS THREADS #/AREA URNS LPF: PRESENT /LPF
NEUTROPHILS # BLD AUTO: 2.6 10E9/L (ref 1.6–8.3)
NEUTROPHILS NFR BLD AUTO: 46.5 %
NITRATE UR QL: NEGATIVE
OPIATES UR QL SCN: NEGATIVE
PCP UR QL SCN: NEGATIVE
PH UR STRIP: 5.5 PH (ref 5–7)
PHOSPHATE SERPL-MCNC: 2.9 MG/DL (ref 2.5–4.5)
PLATELET # BLD AUTO: 411 10E9/L (ref 150–450)
POTASSIUM SERPL-SCNC: 3.4 MMOL/L (ref 3.4–5.3)
PROT SERPL-MCNC: 6.9 G/DL (ref 6.8–8.8)
RBC # BLD AUTO: 4.31 10E12/L (ref 3.8–5.2)
RBC #/AREA URNS AUTO: 0 /HPF (ref 0–2)
SODIUM SERPL-SCNC: 139 MMOL/L (ref 133–144)
SOURCE: ABNORMAL
SP GR UR STRIP: 1.01 (ref 1–1.03)
SQUAMOUS #/AREA URNS AUTO: <1 /HPF (ref 0–1)
TSH SERPL DL<=0.005 MIU/L-ACNC: 1.4 MU/L (ref 0.4–4)
UROBILINOGEN UR STRIP-MCNC: NORMAL MG/DL (ref 0–2)
WBC # BLD AUTO: 5.5 10E9/L (ref 4–11)
WBC #/AREA URNS AUTO: 0 /HPF (ref 0–5)

## 2020-01-31 PROCEDURE — 72125 CT NECK SPINE W/O DYE: CPT

## 2020-01-31 PROCEDURE — 12000000 ZZH R&B MED SURG/OB

## 2020-01-31 PROCEDURE — 00000146 ZZHCL STATISTIC GLUCOSE BY METER IP

## 2020-01-31 PROCEDURE — 70450 CT HEAD/BRAIN W/O DYE: CPT

## 2020-01-31 PROCEDURE — 85025 COMPLETE CBC W/AUTO DIFF WBC: CPT | Performed by: EMERGENCY MEDICINE

## 2020-01-31 PROCEDURE — 84481 FREE ASSAY (FT-3): CPT | Performed by: HOSPITALIST

## 2020-01-31 PROCEDURE — 84443 ASSAY THYROID STIM HORMONE: CPT | Performed by: HOSPITALIST

## 2020-01-31 PROCEDURE — 80320 DRUG SCREEN QUANTALCOHOLS: CPT | Performed by: EMERGENCY MEDICINE

## 2020-01-31 PROCEDURE — 81001 URINALYSIS AUTO W/SCOPE: CPT | Performed by: EMERGENCY MEDICINE

## 2020-01-31 PROCEDURE — 36415 COLL VENOUS BLD VENIPUNCTURE: CPT | Performed by: HOSPITALIST

## 2020-01-31 PROCEDURE — 25000128 H RX IP 250 OP 636: Performed by: EMERGENCY MEDICINE

## 2020-01-31 PROCEDURE — 93005 ELECTROCARDIOGRAM TRACING: CPT

## 2020-01-31 PROCEDURE — 25500064 ZZH RX 255 OP 636: Performed by: HOSPITALIST

## 2020-01-31 PROCEDURE — 80307 DRUG TEST PRSMV CHEM ANLYZR: CPT | Performed by: EMERGENCY MEDICINE

## 2020-01-31 PROCEDURE — 96375 TX/PRO/DX INJ NEW DRUG ADDON: CPT

## 2020-01-31 PROCEDURE — 96365 THER/PROPH/DIAG IV INF INIT: CPT

## 2020-01-31 PROCEDURE — 84100 ASSAY OF PHOSPHORUS: CPT | Performed by: HOSPITALIST

## 2020-01-31 PROCEDURE — 25800030 ZZH RX IP 258 OP 636: Performed by: EMERGENCY MEDICINE

## 2020-01-31 PROCEDURE — 84703 CHORIONIC GONADOTROPIN ASSAY: CPT | Performed by: EMERGENCY MEDICINE

## 2020-01-31 PROCEDURE — A9585 GADOBUTROL INJECTION: HCPCS | Performed by: HOSPITALIST

## 2020-01-31 PROCEDURE — 95700 EEG CONT REC W/VID EEG TECH: CPT

## 2020-01-31 PROCEDURE — 80053 COMPREHEN METABOLIC PANEL: CPT | Performed by: EMERGENCY MEDICINE

## 2020-01-31 PROCEDURE — 82550 ASSAY OF CK (CPK): CPT | Performed by: EMERGENCY MEDICINE

## 2020-01-31 PROCEDURE — 25000128 H RX IP 250 OP 636

## 2020-01-31 PROCEDURE — 99223 1ST HOSP IP/OBS HIGH 75: CPT | Mod: AI | Performed by: HOSPITALIST

## 2020-01-31 PROCEDURE — 70553 MRI BRAIN STEM W/O & W/DYE: CPT

## 2020-01-31 PROCEDURE — 83605 ASSAY OF LACTIC ACID: CPT | Performed by: EMERGENCY MEDICINE

## 2020-01-31 PROCEDURE — 99285 EMERGENCY DEPT VISIT HI MDM: CPT | Mod: 25

## 2020-01-31 PROCEDURE — 95714 VEEG EA 12-26 HR UNMNTR: CPT

## 2020-01-31 PROCEDURE — 83735 ASSAY OF MAGNESIUM: CPT | Performed by: HOSPITALIST

## 2020-01-31 PROCEDURE — 40000061 ZZH STATISTIC EEG TIME EA 10 MIN

## 2020-01-31 PROCEDURE — 96361 HYDRATE IV INFUSION ADD-ON: CPT

## 2020-01-31 RX ORDER — KETOROLAC TROMETHAMINE 30 MG/ML
30 INJECTION, SOLUTION INTRAMUSCULAR; INTRAVENOUS EVERY 6 HOURS PRN
Status: DISCONTINUED | OUTPATIENT
Start: 2020-01-31 | End: 2020-02-03 | Stop reason: HOSPADM

## 2020-01-31 RX ORDER — LIDOCAINE 40 MG/G
CREAM TOPICAL
Status: DISCONTINUED | OUTPATIENT
Start: 2020-01-31 | End: 2020-02-03 | Stop reason: HOSPADM

## 2020-01-31 RX ORDER — GADOBUTROL 604.72 MG/ML
9 INJECTION INTRAVENOUS ONCE
Status: COMPLETED | OUTPATIENT
Start: 2020-01-31 | End: 2020-01-31

## 2020-01-31 RX ORDER — LORAZEPAM 2 MG/ML
2 INJECTION INTRAMUSCULAR EVERY 5 MIN PRN
Status: DISCONTINUED | OUTPATIENT
Start: 2020-01-31 | End: 2020-01-31 | Stop reason: ALTCHOICE

## 2020-01-31 RX ORDER — LORAZEPAM 2 MG/ML
2 INJECTION INTRAMUSCULAR
Status: COMPLETED | OUTPATIENT
Start: 2020-01-31 | End: 2020-02-02

## 2020-01-31 RX ORDER — NALOXONE HYDROCHLORIDE 0.4 MG/ML
.1-.4 INJECTION, SOLUTION INTRAMUSCULAR; INTRAVENOUS; SUBCUTANEOUS
Status: DISCONTINUED | OUTPATIENT
Start: 2020-01-31 | End: 2020-02-03

## 2020-01-31 RX ORDER — ACETAMINOPHEN 325 MG/1
650 TABLET ORAL EVERY 4 HOURS PRN
Status: DISCONTINUED | OUTPATIENT
Start: 2020-01-31 | End: 2020-02-03 | Stop reason: HOSPADM

## 2020-01-31 RX ORDER — LEVETIRACETAM 5 MG/ML
500 INJECTION INTRAVASCULAR EVERY 12 HOURS
Status: DISCONTINUED | OUTPATIENT
Start: 2020-02-01 | End: 2020-01-31

## 2020-01-31 RX ORDER — NALOXONE HYDROCHLORIDE 0.4 MG/ML
0.4 INJECTION, SOLUTION INTRAMUSCULAR; INTRAVENOUS; SUBCUTANEOUS ONCE
Status: COMPLETED | OUTPATIENT
Start: 2020-01-31 | End: 2020-01-31

## 2020-01-31 RX ORDER — METOCLOPRAMIDE HYDROCHLORIDE 5 MG/ML
10 INJECTION INTRAMUSCULAR; INTRAVENOUS EVERY 6 HOURS PRN
Status: DISCONTINUED | OUTPATIENT
Start: 2020-01-31 | End: 2020-02-03

## 2020-01-31 RX ORDER — PROCHLORPERAZINE MALEATE 10 MG
10 TABLET ORAL EVERY 6 HOURS PRN
Status: DISCONTINUED | OUTPATIENT
Start: 2020-01-31 | End: 2020-02-03

## 2020-01-31 RX ORDER — NALOXONE HYDROCHLORIDE 0.4 MG/ML
INJECTION, SOLUTION INTRAMUSCULAR; INTRAVENOUS; SUBCUTANEOUS
Status: COMPLETED
Start: 2020-01-31 | End: 2020-01-31

## 2020-01-31 RX ORDER — LEVETIRACETAM 5 MG/ML
500 INJECTION INTRAVASCULAR EVERY 12 HOURS
Status: DISCONTINUED | OUTPATIENT
Start: 2020-02-01 | End: 2020-02-01

## 2020-01-31 RX ORDER — PROCHLORPERAZINE 25 MG
25 SUPPOSITORY, RECTAL RECTAL EVERY 12 HOURS PRN
Status: DISCONTINUED | OUTPATIENT
Start: 2020-01-31 | End: 2020-02-03

## 2020-01-31 RX ORDER — ACETAMINOPHEN 650 MG/1
650 SUPPOSITORY RECTAL EVERY 4 HOURS PRN
Status: DISCONTINUED | OUTPATIENT
Start: 2020-01-31 | End: 2020-02-03 | Stop reason: HOSPADM

## 2020-01-31 RX ORDER — KETOROLAC TROMETHAMINE 30 MG/ML
30 INJECTION, SOLUTION INTRAMUSCULAR; INTRAVENOUS EVERY 6 HOURS
Status: DISCONTINUED | OUTPATIENT
Start: 2020-01-31 | End: 2020-01-31

## 2020-01-31 RX ORDER — METOCLOPRAMIDE 5 MG/1
10 TABLET ORAL EVERY 6 HOURS PRN
Status: DISCONTINUED | OUTPATIENT
Start: 2020-01-31 | End: 2020-02-03

## 2020-01-31 RX ORDER — ONDANSETRON 2 MG/ML
4 INJECTION INTRAMUSCULAR; INTRAVENOUS EVERY 6 HOURS PRN
Status: DISCONTINUED | OUTPATIENT
Start: 2020-01-31 | End: 2020-02-03 | Stop reason: HOSPADM

## 2020-01-31 RX ORDER — ONDANSETRON 4 MG/1
4 TABLET, ORALLY DISINTEGRATING ORAL EVERY 6 HOURS PRN
Status: DISCONTINUED | OUTPATIENT
Start: 2020-01-31 | End: 2020-02-03 | Stop reason: HOSPADM

## 2020-01-31 RX ADMIN — LEVETIRACETAM 3600 MG: 100 INJECTION, SOLUTION INTRAVENOUS at 17:37

## 2020-01-31 RX ADMIN — NALOXONE HYDROCHLORIDE 0.4 MG: 0.4 INJECTION, SOLUTION INTRAMUSCULAR; INTRAVENOUS; SUBCUTANEOUS at 16:22

## 2020-01-31 RX ADMIN — GADOBUTROL 9 ML: 604.72 INJECTION INTRAVENOUS at 21:43

## 2020-01-31 RX ADMIN — LORAZEPAM 2 MG: 2 INJECTION INTRAMUSCULAR; INTRAVENOUS at 17:11

## 2020-01-31 RX ADMIN — SODIUM CHLORIDE 500 ML: 9 INJECTION, SOLUTION INTRAVENOUS at 16:53

## 2020-01-31 SDOH — HEALTH STABILITY: MENTAL HEALTH: HOW OFTEN DO YOU HAVE A DRINK CONTAINING ALCOHOL?: NEVER

## 2020-01-31 ASSESSMENT — ACTIVITIES OF DAILY LIVING (ADL): ADLS_ACUITY_SCORE: 17

## 2020-01-31 NOTE — ED TRIAGE NOTES
Fell at work, eye noted to be deviated to the right. No tonic/clonic activity. Unresponsive to voice. 5 mg versed en route. Unknown history. [place] airport.

## 2020-01-31 NOTE — ED PROVIDER NOTES
History     Chief Complaint:  Altered Mental Status      The history is provided by the EMS personnel. The history is limited by the condition of the patient.      Indy Erazo is a 35 year old female who presents via EMS from the [facility] airport with an altered mental status. Per EMS, they were initially called to the airport due to a reported unwitnessed fall. The patient was found laying against a wall inside of a loading dock connected to the airport. On arrival, the patient was noted to have eyes deviated to the right. No tonic clonic movements for EMS and she has been unresponsive since being in their care. Initial blood pressure was 118 systolic, though dropped to 86 systolic on arrival to the ED. Versed 5 mg provided en route.  EMS reports occasional spontaneous movements, though she has been unable to respond to any questions. Blood glucose 186.     Allergies:  NKDA     Medications:    The patient is not currently taking any prescribed medications.     Past Medical History:    The patient does not have any past pertinent medical history.     Past Surgical History:    History reviewed. No pertinent surgical history.     Family History:    History reviewed. No pertinent family history.       Social History:  Smoking status: never  PCP: [facility]  Marital Status:        Review of Systems   Unable to perform ROS: Mental status change       Physical Exam     Patient Vitals for the past 24 hrs:   BP Temp Temp src Pulse Heart Rate Resp SpO2 Weight   01/31/20 1730 121/85 -- -- 76 76 8 100 % --   01/31/20 1715 118/87 -- -- 79 84 12 100 % --   01/31/20 1700 (!) 128/91 -- -- 77 81 15 100 % --   01/31/20 1645 (!) 135/91 -- -- 77 77 15 100 % --   01/31/20 1630 130/80 -- -- 84 -- -- -- --   01/31/20 1615 (!) 106/97 -- -- 84 84 25 100 % --   01/31/20 1613 110/78 97.6  F (36.4  C) Temporal -- 82 16 100 % 90 kg (198 lb 6.6 oz)        Physical Exam  Gen: Patient somnolent, unresponsive to  pain.    Eye:   Pupils are pinpoint, reactive from 3 to 2 mm.   Sclera non-injected.  Eyes deviated to the right.    ENT:   Moist mucus membranes.     Normal tongue.    Oropharynx without lesions.   Tongue atraumatic.    Cardiac:     Normal rate and regular rhythm.    No murmurs, gallops, or rubs.    Pulmonary:     Clear to auscultation bilaterally.    No wheezes, rales, or rhonchi.  No increased work of breathing.    Abdomen:     Normal active bowel sounds.     Abdomen is soft and non-distended, without focal tenderness.    Musculoskeletal:     Hyporeflexive extremities bilaterally. Pen markings on right lower extremity.  No joint effusion.    Extremities:    No edema.    Skin:   Dry. Extremities cool to touch.     Neurologic:    [place]a Coma Scale - GCS (calculator)    Motor 1=None   Verbal 1=None   Eye Opening 1=None   Total: 3     Patient has corneal reflex.  Unresponsive to painful stimulus.      Emergency Department Course     ECG (16:19:19):  Rate 81 bpm. CO interval 166. QRS duration 84. QT/QTc 374/434. P-R-T axes 8 -10 10. Normal sinus rhythm. Normal ECG. Agree with computer interpretation.  Interpreted at 1621 by Irlanda Gallego MD.     Imaging:  Radiographic findings were communicated with the patient who voiced understanding of the findings.    CT Head w/o Contrast  No evidence of acute intracranial hemorrhage, mass, or  herniation.  As read by Radiology.    Cervical Spine CT w/o contrast  1. No evidence of acute fracture or subluxation in the cervical spine.  2. Markedly enlarged left thyroid gland containing a thyroid nodule  measuring up to at least 4.7 cm. Recommend further evaluation with  thyroid ultrasound on a nonemergent basis.      As read by Radiology.    Laboratory:  CBC: WBC 5.5, HGB 9.8 (L),    CMP: Glucose 187 (H), albumin 3.3 (L), o/w WNL (Creatinine: 0.60)  Alcohol blood level: <0.01  CK total: 104  HCG qual: negative  Lactic acid whole blood (1639); 1.8    UA: glucose 30,  mucous present, o/w negative   Drug abuse screen 77 urine: benzodiazepine positive, o/w negative     Procedures:  None.     Interventions:  1622: Narcan 0.4 mg IV  1653: NS 0.5 L IV Bolus   1711: Ativan 2 mg IV  1737: Keppra 3600 mg IV    Emergency Department Course:  1608: Nursing notes and vitals reviewed. I performed an exam of the patient as documented above.     IV inserted. Medicine administered as documented above.     1623: No response to Narcan.     Blood drawn. This was sent to the lab for further testing, results above.    The patient was sent for a head CT, cervical spine CT, and a chest xray while in the emergency department, findings above.     1650: I rechecked the patient.    1704: I rechecked the patient.  Eyes deviated to the right, small frequency convulsions noted to bilateral upper extremities, with rapid shallow breathing.  Vitals stable.  Patient continues to be unresponsive.  Ativan and Keppra bolus ordered.    1710: I consulted with Dr. Siegel, Neuro Critical Care, regarding the patient's history and presentation here in the emergency department.     1714: I rechecked the patient. Patient is sleepy but is able to answer questions.     1728: I consulted with the Neuro Team regarding the patient's history and presentation here in the ED.     1800: I consulted with Dr. Frias of the hospitalist services. They are in agreement to accept the patient for admission.    Findings and plan explained to the Patient who consents to admission. Discussed the patient with Dr. Frias, who will admit the patient to a medical bed for further monitoring, evaluation, and treatment.     Impression & Plan      Medical Decision Making:  Indy Erazo is a 35-year-old female otherwise healthy who presents today following an unwitnessed fall with unresponsiveness, eyes deviated to the right.  On exam, the patient has no signs of trauma.  CT of the head and cervical spine were negative for hemorrhage, skull fracture, or  cervical spine fracture.  I consulted neuro critical care when the patient was still unresponsive given concern for nonconvulsive status epilepticus. Her deviated eye movements and unresponsiveness seemed to improve after dose of IV Ativan.  While the ED, she remained slightly confused with slow and slurred speech however her mental status steadily seemed to be improving, consistent with a post-ictal syndrome.   At this point, given that mental status seems to be improving, and nonfocal exam otherwise, work-up will continue in the hospital including EEG and MRI.  The cause of suspected seizure is unclear at this point.  There is not any electrolyte abnormality, history of hypoglycemia, trauma, intracranial mass, toxidrome, history of alcohol use to explain her symptoms.  She will be admitted to neuro floor for continued monitoring, EEG, MRI.      Diagnosis:    ICD-10-CM    1. Unresponsive R41.89 Drug abuse screen 77 urine ([place], , )     CK total       Disposition:  Admitted to Dr. Altagracia ZARAGOZA, Hallie Bradford, am serving as a scribe at 4:08 PM on 1/31/2020 to document services personally performed by Irlanda Gallego MD based on my observations and the provider's statements to me.       Hallie Bradford  1/31/2020    EMERGENCY DEPARTMENT       Irlanda Gallego MD  01/31/20 2328

## 2020-01-31 NOTE — ED NOTES
Bed: ST01  Expected date:   Expected time:   Means of arrival:   Comments:  511  25 M poss seizure/versed given/post ictal  1601

## 2020-02-01 ENCOUNTER — APPOINTMENT (OUTPATIENT)
Dept: CT IMAGING | Facility: CLINIC | Age: 35
End: 2020-02-01
Attending: NURSE PRACTITIONER
Payer: COMMERCIAL

## 2020-02-01 LAB
ANION GAP SERPL CALCULATED.3IONS-SCNC: 4 MMOL/L (ref 3–14)
BUN SERPL-MCNC: 11 MG/DL (ref 7–30)
CALCIUM SERPL-MCNC: 8.4 MG/DL (ref 8.5–10.1)
CHLORIDE SERPL-SCNC: 113 MMOL/L (ref 94–109)
CO2 SERPL-SCNC: 24 MMOL/L (ref 20–32)
CREAT SERPL-MCNC: 0.51 MG/DL (ref 0.52–1.04)
ERYTHROCYTE [DISTWIDTH] IN BLOOD BY AUTOMATED COUNT: 16.1 % (ref 10–15)
GFR SERPL CREATININE-BSD FRML MDRD: >90 ML/MIN/{1.73_M2}
GLUCOSE BLDC GLUCOMTR-MCNC: 111 MG/DL (ref 70–99)
GLUCOSE SERPL-MCNC: 126 MG/DL (ref 70–99)
HCT VFR BLD AUTO: 35.8 % (ref 35–47)
HGB BLD-MCNC: 11.1 G/DL (ref 11.7–15.7)
LACTATE BLD-SCNC: 1.1 MMOL/L (ref 0.7–2)
MAGNESIUM SERPL-MCNC: 2.3 MG/DL (ref 1.6–2.3)
MCH RBC QN AUTO: 22.6 PG (ref 26.5–33)
MCHC RBC AUTO-ENTMCNC: 31 G/DL (ref 31.5–36.5)
MCV RBC AUTO: 73 FL (ref 78–100)
PLATELET # BLD AUTO: 466 10E9/L (ref 150–450)
POTASSIUM SERPL-SCNC: 3.9 MMOL/L (ref 3.4–5.3)
RBC # BLD AUTO: 4.92 10E12/L (ref 3.8–5.2)
SODIUM SERPL-SCNC: 141 MMOL/L (ref 133–144)
T3FREE SERPL-MCNC: 2.4 PG/ML (ref 2.3–4.2)
WBC # BLD AUTO: 5.5 10E9/L (ref 4–11)

## 2020-02-01 PROCEDURE — 84481 FREE ASSAY (FT-3): CPT | Performed by: HOSPITALIST

## 2020-02-01 PROCEDURE — 99207 ZZC MOONLIGHTING INDICATOR: CPT | Performed by: INTERNAL MEDICINE

## 2020-02-01 PROCEDURE — 12000047 ZZH R&B IMCU

## 2020-02-01 PROCEDURE — 83735 ASSAY OF MAGNESIUM: CPT | Performed by: NURSE PRACTITIONER

## 2020-02-01 PROCEDURE — 80048 BASIC METABOLIC PNL TOTAL CA: CPT | Performed by: NURSE PRACTITIONER

## 2020-02-01 PROCEDURE — 00000146 ZZHCL STATISTIC GLUCOSE BY METER IP

## 2020-02-01 PROCEDURE — 85027 COMPLETE CBC AUTOMATED: CPT | Performed by: NURSE PRACTITIONER

## 2020-02-01 PROCEDURE — 25000128 H RX IP 250 OP 636: Performed by: HOSPITALIST

## 2020-02-01 PROCEDURE — 95714 VEEG EA 12-26 HR UNMNTR: CPT

## 2020-02-01 PROCEDURE — 25000128 H RX IP 250 OP 636: Performed by: NURSE PRACTITIONER

## 2020-02-01 PROCEDURE — 99233 SBSQ HOSP IP/OBS HIGH 50: CPT | Performed by: INTERNAL MEDICINE

## 2020-02-01 PROCEDURE — 99232 SBSQ HOSP IP/OBS MODERATE 35: CPT | Performed by: NURSE PRACTITIONER

## 2020-02-01 PROCEDURE — 83605 ASSAY OF LACTIC ACID: CPT | Performed by: NURSE PRACTITIONER

## 2020-02-01 PROCEDURE — 25800030 ZZH RX IP 258 OP 636: Performed by: NURSE PRACTITIONER

## 2020-02-01 PROCEDURE — 70450 CT HEAD/BRAIN W/O DYE: CPT

## 2020-02-01 PROCEDURE — 85018 HEMOGLOBIN: CPT | Performed by: NURSE PRACTITIONER

## 2020-02-01 PROCEDURE — 36415 COLL VENOUS BLD VENIPUNCTURE: CPT | Performed by: NURSE PRACTITIONER

## 2020-02-01 RX ORDER — LEVETIRACETAM 5 MG/ML
500 INJECTION INTRAVASCULAR EVERY 12 HOURS
Status: DISCONTINUED | OUTPATIENT
Start: 2020-02-01 | End: 2020-02-02

## 2020-02-01 RX ORDER — SODIUM CHLORIDE 9 MG/ML
INJECTION, SOLUTION INTRAVENOUS CONTINUOUS
Status: DISCONTINUED | OUTPATIENT
Start: 2020-02-01 | End: 2020-02-03

## 2020-02-01 RX ORDER — LEVETIRACETAM 500 MG/1
500 TABLET ORAL 2 TIMES DAILY
Status: DISCONTINUED | OUTPATIENT
Start: 2020-02-01 | End: 2020-02-01

## 2020-02-01 RX ADMIN — LEVETIRACETAM 500 MG: 5 INJECTION INTRAVENOUS at 05:05

## 2020-02-01 RX ADMIN — SODIUM CHLORIDE: 9 INJECTION, SOLUTION INTRAVENOUS at 22:09

## 2020-02-01 RX ADMIN — SODIUM CHLORIDE 500 ML: 9 INJECTION, SOLUTION INTRAVENOUS at 20:28

## 2020-02-01 RX ADMIN — LEVETIRACETAM 500 MG: 5 INJECTION INTRAVENOUS at 22:42

## 2020-02-01 RX ADMIN — LORAZEPAM 2 MG: 2 INJECTION INTRAMUSCULAR; INTRAVENOUS at 18:26

## 2020-02-01 ASSESSMENT — ACTIVITIES OF DAILY LIVING (ADL)
ADLS_ACUITY_SCORE: 20
ADLS_ACUITY_SCORE: 20
ADLS_ACUITY_SCORE: 19
ADLS_ACUITY_SCORE: 19
ADLS_ACUITY_SCORE: 20
ADLS_ACUITY_SCORE: 19

## 2020-02-01 NOTE — PROGRESS NOTES
[facility]    Hospitalist Progress Note    Assessment & Plan   Indy Erazo is a 35 year old female with no significant past medical history who was brought into the ED after she was found unresponsive at her workplace.  Remained unresponsive in the ED for about 45 minutes with eyes were deviated to the right, no tonic-clonic activity. Received Versed via EMS, given Ativan in the ED after which she became responsive.  CT head and cervical spine unrevealing.  Admitted as inpatient for evaluation of likely seizure.     Unresponsive episode, likely new onset seizure  Found down, unresponsive at her workplace.  Eyes noted to be deviated to the right, no tonic-clonic seizure activity noted. Given 5 mg of Versed by EMS.  Continued to be unresponsive for about 45 minutes into her ED course.  Received Narcan with no response.  Then received 2 mg Ativan after which she became more responsive.  Mildly hypotensive initially but stable vitals, awake and responsive when seen by me.  Afebrile.  Labs mostly unremarkable other than hemoglobin of 9.8, UA negative for infection, negative ethanol, urine tox screen positive for benzodiazepines [had received Ativan prior].  CT cervical spine with no evidence of fracture.  Did show markedly enlarged left thyroid gland containing thyroid nodule measuring up to 4.7 cm. Head CT with no acute pathology. Keppra loading dose also given in the ED.  Patient was seen by neuro critical care.  -Consult neurology  -Video EEG monitoring ordered by Neurologist who saw her in the ED.   -MRI brain w/wo contrast: normal.   -Received Keppra loading dose in the ED.  Have ordered maintenance dosing 500 mg bid to begin today morning.  -PRN Ativan for seizures  -Allow regular diet as patient is awake and responsive now  -Seizure precautions     Thyroid nodule CT cervical spine  Showed markedly enlarged left thyroid gland containing thyroid nodule measuring up to 4.7 cm.  -Check TSH, FT3,  FT4  -Will need thyroid ultrasound and endocrinology referral later for further evaluation.     DVT Prophylaxis: Low Risk/Ambulatory with no VTE prophylaxis indicated  Code Status: Full Code    Expected discharge: 2 - 3 days, recommended to prior living arrangement once mental status at baseline and Neurology work-up completed.  Forest Mcclellan MD   Text Page (7am to 6pm)    Interval History   No seizures overnight. Neurology consult pending. Patient seen and examined.  No acute events over night.  No fevers or chills. No chest pain or SOB. Answered patient questions.    -Data reviewed today: I reviewed all new labs and imaging results over the last 24 hours. I personally reviewed the EKG tracing showing SR.    Physical Exam   Temp: 98.1  F (36.7  C) Temp src: Oral BP: 101/65 Pulse: 76 Heart Rate: 70 Resp: 16 SpO2: 99 % O2 Device: None (Room air)    Vitals:    01/31/20 1613   Weight: 90 kg (198 lb 6.6 oz)     Vital Signs with Ranges  Temp:  [97.6  F (36.4  C)-98.3  F (36.8  C)] 98.1  F (36.7  C)  Pulse:  [76-84] 76  Heart Rate:  [66-84] 70  Resp:  [8-25] 16  BP: (101-135)/(65-97) 101/65  SpO2:  [99 %-100 %] 99 %  No intake/output data recorded.    Constitutional: Awake, groggy, cooperative, no apparent distress.  Eyes: no icterus, EOMs intact  HEENT: Moist mucous membranes  Respiratory: Clear to auscultation bilaterally, no crackles or wheezing.  Cardiovascular: Regular rate and rhythm, normal S1 and S2, and no murmur noted.  GI: Soft, non-distended, non-tender, normal bowel sounds.  Skin: No rashes, no cyanosis, no edema.  Musculoskeletal: No joint swelling, erythema or tenderness.  Neurologic: Drowsy but easily arousable and is oriented and engages in appropriate conversation, no facial asymmetry, moving all extremities, fluent speech  Psychiatric: Calm and pleasant, no obvious anxiety or depression.     Medications     - MEDICATION INSTRUCTIONS -         levETIRAcetam  500 mg Intravenous Q12H     sodium chloride (PF)   3 mL Intracatheter Q8H       Data   Recent Labs   Lab 01/31/20  1639   WBC 5.5   HGB 9.8*   MCV 73*         POTASSIUM 3.4   CHLORIDE 109   CO2 26   BUN 9   CR 0.60   ANIONGAP 4   ARIAS 8.6   *   ALBUMIN 3.3*   PROTTOTAL 6.9   BILITOTAL 0.2   ALKPHOS 73   ALT 17   AST 10       Imaging:   Recent Results (from the past 24 hour(s))   CT Head w/o Contrast    Narrative    CT SCAN OF THE HEAD WITHOUT CONTRAST   1/31/2020 4:59 PM     HISTORY: Altered mental status.    TECHNIQUE: Axial images of the head and coronal reformations without  IV contrast material. Radiation dose for this scan was reduced using  automated exposure control, adjustment of the mA and/or kV according  to patient size, or iterative reconstruction technique.    COMPARISON: None.    FINDINGS: There is no evidence of intracranial hemorrhage, mass, acute  infarct or anomaly. The ventricles are normal in size, shape and  configuration. The brain parenchyma and subarachnoid spaces are  normal.     The visualized portions of the sinuses and mastoids appear normal. The  bony calvarium and bones of the skull base appear intact.       Impression    IMPRESSION: No evidence of acute intracranial hemorrhage, mass, or  herniation.      GISELLA MORALES MD   Cervical spine CT w/o contrast    Narrative    CT CERVICAL SPINE WITHOUT CONTRAST   1/31/2020 5:00 PM     HISTORY: Fall, altered mental status.     TECHNIQUE: Axial images of the cervical spine were obtained without  intravenous contrast. Multiplanar reformations were performed.   Radiation dose for this scan was reduced using automated exposure  control, adjustment of the mA and/or kV according to patient size, or  iterative reconstruction technique.    COMPARISON: None.    FINDINGS: No evidence of fracture. No prevertebral soft tissue  swelling. Alignment is normal. Vertebral body height is maintained. No  destructive osseous lesions. No significant loss of intervertebral  disc space. No  significant spinal canal or neural foraminal narrowing.  Large right cervical rib from the C7 vertebral body.    Visualized paraspinous tissues: Markedly enlarged left thyroid gland  containing a nodule measuring up to at least 4.7 cm.      Impression    IMPRESSION:   1. No evidence of acute fracture or subluxation in the cervical spine.  2. Markedly enlarged left thyroid gland containing a thyroid nodule  measuring up to at least 4.7 cm. Recommend further evaluation with  thyroid ultrasound on a nonemergent basis.          GISELLA MORALES MD   MR Brain w/o & w Contrast    Narrative    EXAM: MR BRAIN W/O and W CONTRAST  LOCATION: [facility]  DATE/TIME: 1/31/2020 9:12 PM    INDICATION: Syncope, Unresponsive  COMPARISON: CT head 01/31/2020  CONTRAST: 9 mL Gadavist  TECHNIQUE: Routine multiplanar multisequence head MRI without and with intravenous contrast.    FINDINGS:  INTRACRANIAL CONTENTS: No acute or subacute infarct. No mass, acute hemorrhage, or extra-axial fluid collections. Normal brain parenchymal signal. Normal ventricles and sulci. Normal position of the cerebellar tonsils. No pathologic contrast enhancement.    SELLA: No abnormality accounting for technique.    OSSEOUS STRUCTURES/SOFT TISSUES: Normal marrow signal. The major intracranial vascular flow voids are maintained.     ORBITS: No abnormality accounting for technique.     SINUSES/MASTOIDS: No paranasal sinus mucosal disease. No middle ear or mastoid effusion.       Impression    IMPRESSION:  1.  Normal head MRI.

## 2020-02-01 NOTE — PROGRESS NOTES
PRELIMINARY EEG REPORT:    Overnight EEG was reviewed till 10 am. EEG was essentially normal. A symmetric and well-formed PDR of 9 Hz was present over posterior head regions.  Scattered theta was seen during waking, but this wasn't excessive. Vertex waves, symmetric sleep spindles and K complexes are seen during stage II sleep. No epileptiform discharges or seizures recorded.     Jemma Worley MD

## 2020-02-01 NOTE — PLAN OF CARE
Possible seizures/24 hr EEG, no seizure noted this shift, patient very lethargic, language barrier-hard to assess neuros. Patient oriented to self, place, situation, hard to assess time, otherwise neuros grossly intact. Needs cues to follow commands. VSS, tele NSR, bedrest, nothing by mouth since midnight, however, reg diet ordered. Patient scoring green on aggression stoplight tool, plan of care to be determined.

## 2020-02-01 NOTE — H&P
[facility]    History and Physical  Hospitalist       Date of Admission:  1/31/2020    Assessment & Plan   Indy Erazo is a 35 year old female with no significant past medical history who was brought into the ED after she was found unresponsive at her workplace.  Remained unresponsive in the ED for about 45 minutes with eyes were deviated to the right, no tonic-clonic activity. Received Versed via EMS, given Ativan in the ED after which she became responsive.  CT head and cervical spine unrevealing.  Admitted as inpatient for evaluation of likely seizure.    Unresponsive episode, likely new onset seizure  Found down, unresponsive at her workplace.  Eyes noted to be deviated to the right, no tonic-clonic seizure activity noted.  Given 5 mg of Versed by EMS.  Continued to be unresponsive for about 45 minutes into her ED course.  Received Narcan with no response.  Then received 2 mg Ativan after which she became more responsive.  Mildly hypotensive initially but stable vitals, awake and responsive when seen by me.  Afebrile.  Labs mostly unremarkable other than hemoglobin of 9.8, UA negative for infection, negative ethanol, urine tox screen positive for benzodiazepines [had received Ativan prior].  CT cervical spine with no evidence of fracture.  Did show markedly enlarged left thyroid gland containing thyroid nodule measuring up to 4.7 cm.  Head CT with no acute pathology.  Keppra loading dose also given in the ED.  Patient was seen by neuro critical care.  -Admit as inpatient to neurology floor  -Consult to neurology  -Video EEG monitoring ordered by Neurologist who saw her in the ED.   -Have ordered MRI brain w/wo contrast  -Received Keppra loading dose in the ED.  Have rdered maintenance dosing 500 mg bid to begin tomorrow morning.  -PRN Ativan for seizures  -Allow regular diet as patient is awake and responsive now  -Seizure precautions  -Check magnesium and phosphorus    Thyroid nodule CT cervical  spine  Showed markedly enlarged left thyroid gland containing thyroid nodule measuring up to 4.7 cm.  -Check TSH, FT3, FT4  -Will need thyroid ultrasound and endocrinology referral later for further evaluation.    DVT Prophylaxis: Low Risk/Ambulatory with no VTE prophylaxis indicated  Code Status: Full Code  Expected discharge: 2 - 3 days, recommended to prior living arrangement once mental status at baseline and Neurology work-up completed.    Elizabeth Frias MD    Primary Care Physician   [facility]    Chief Complaint   Unresponsive episode    History is obtained from the patient, ER physician and chart review    History of Present Illness   Indy Erazo is a 35 year old female with no significant past medical history who was brought into the ED after she was found unresponsive at her workplace.  Remained unresponsive in the ED for about 45 minutes with eyes were deviated to the right, no tonic-clonic activity. Received Versed via EMS, given Narcan in the ED with no response.  She was then given 2 mg IV Ativan after which she became responsive.  CT head and cervical spine unrevealing.  Admitted as inpatient for evaluation of likely seizure.  Patient was awake and responsive when seen by me.  However she was still groggy and told me that she does not remember anything.  She works as a  [place]  She remembers going in for her 2 PM shift today.  She has been in normal state of health recently.  She denies any recent fevers, chills, sweats, headache, weakness or numbness anywhere.  No prior history of seizure disorder.  No history of seizures or neurological disorders in the family.  Both her parents are .  She is a single mom of 9 children ages ranging 18 years to 3 years.  She lives in [place].  No history of alcohol or tobacco or drug abuse.  At present she denies having any pain.  She admits to a mild dull intermittent headache.  No abdominal pain, dysuria, issues with  bowel function.      Past Medical History    I have reviewed this patient's medical history and updated it with pertinent information if needed.   Past Medical History:   Diagnosis Date     Dry eyes, bilateral        Past Surgical History   I have reviewed this patient's surgical history and updated it with pertinent information if needed.  No past surgical history on file.  No pertinent past surgical history.    Prior to Admission Medications   None     Allergies   No Known Allergies    Social History   I have reviewed this patient's social history and updated it with pertinent information if needed. Indy Erazo  reports that she has never smoked. She does not have any smokeless tobacco history on file. She reports that she does not drink alcohol or use drugs.    Family History   I have reviewed this patient's family history and updated it with pertinent information if needed.   Family History   Problem Relation Age of Onset     No Known Problems Mother      No Known Problems Father      No Known Problems Sister      No Known Problems Brother      Glaucoma No family hx of      Macular Degeneration No family hx of      Amblyopia No family hx of        Review of Systems   The 10 point Review of Systems is negative other than noted in the HPI or here.     Physical Exam   Temp: 97.6  F (36.4  C) Temp src: Temporal BP: 121/85 Pulse: 76 Heart Rate: 76 Resp: 8 SpO2: 100 % O2 Device: None (Room air)    Vital Signs with Ranges  Temp:  [97.6  F (36.4  C)] 97.6  F (36.4  C)  Pulse:  [76-84] 76  Heart Rate:  [76-84] 76  Resp:  [8-25] 8  BP: (106-135)/(78-97) 121/85  SpO2:  [100 %] 100 %  198 lbs 6.62 oz    Constitutional: Awake, groggy, cooperative, no apparent distress.  Eyes: no icterus, EOMs intact  HEENT: Moist mucous membranes  Respiratory: Clear to auscultation bilaterally, no crackles or wheezing.  Cardiovascular: Regular rate and rhythm, normal S1 and S2, and no murmur noted.  GI: Soft, non-distended, non-tender, normal  bowel sounds.  Skin: No rashes, no cyanosis, no edema.  Musculoskeletal: No joint swelling, erythema or tenderness.  Neurologic: Drowsy but easily arousable and is oriented and engages in appropriate conversation, no facial asymmetry, moving all extremities, fluent speech  Psychiatric: Calm and pleasant, no obvious anxiety or depression.     Data   Data reviewed today:  I personally reviewed CT scan with result as noted above  Recent Labs   Lab 01/31/20  1639   WBC 5.5   HGB 9.8*   MCV 73*         POTASSIUM 3.4   CHLORIDE 109   CO2 26   BUN 9   CR 0.60   ANIONGAP 4   ARIAS 8.6   *   ALBUMIN 3.3*   PROTTOTAL 6.9   BILITOTAL 0.2   ALKPHOS 73   ALT 17   AST 10       Recent Results (from the past 24 hour(s))   CT Head w/o Contrast    Narrative    CT SCAN OF THE HEAD WITHOUT CONTRAST   1/31/2020 4:59 PM     HISTORY: Altered mental status.    TECHNIQUE: Axial images of the head and coronal reformations without  IV contrast material. Radiation dose for this scan was reduced using  automated exposure control, adjustment of the mA and/or kV according  to patient size, or iterative reconstruction technique.    COMPARISON: None.    FINDINGS: There is no evidence of intracranial hemorrhage, mass, acute  infarct or anomaly. The ventricles are normal in size, shape and  configuration. The brain parenchyma and subarachnoid spaces are  normal.     The visualized portions of the sinuses and mastoids appear normal. The  bony calvarium and bones of the skull base appear intact.       Impression    IMPRESSION: No evidence of acute intracranial hemorrhage, mass, or  herniation.      GISELLA MORALES MD   Cervical spine CT w/o contrast    Narrative    CT CERVICAL SPINE WITHOUT CONTRAST   1/31/2020 5:00 PM     HISTORY: Fall, altered mental status.     TECHNIQUE: Axial images of the cervical spine were obtained without  intravenous contrast. Multiplanar reformations were performed.   Radiation dose for this scan was reduced  using automated exposure  control, adjustment of the mA and/or kV according to patient size, or  iterative reconstruction technique.    COMPARISON: None.    FINDINGS: No evidence of fracture. No prevertebral soft tissue  swelling. Alignment is normal. Vertebral body height is maintained. No  destructive osseous lesions. No significant loss of intervertebral  disc space. No significant spinal canal or neural foraminal narrowing.  Large right cervical rib from the C7 vertebral body.    Visualized paraspinous tissues: Markedly enlarged left thyroid gland  containing a nodule measuring up to at least 4.7 cm.      Impression    IMPRESSION:   1. No evidence of acute fracture or subluxation in the cervical spine.  2. Markedly enlarged left thyroid gland containing a thyroid nodule  measuring up to at least 4.7 cm. Recommend further evaluation with  thyroid ultrasound on a nonemergent basis.          GISELLA MORALES MD

## 2020-02-01 NOTE — CONSULTS
[facility] [place]  [facility]    Neurology Consultation    Indy Erazo MRN# [id number]   YOB: 1985 Age: 35 year old    Code Status:Full Code   Date of Admission: 1/31/2020  Date of Consult:02/01/2020                                                                                       Assessment and Plan:                                         #.  Period of prolonged unresponsiveness, now recovered other than sleepiness.  Otherwise normal EEG background overnight.  Etiology indeterminant-differential diagnosis would include seizure  --We will discontinue video EEG this afternoon.  Continue observation overnight.  Given the possibility of seizure, continue levetiracetam 500 mg twice daily  Plan discussed with patient with use of phone [place] Islander     #. DVT Prophylaxis  --Mechanical + Patient is able to ambulate    We will see in follow-up tomorrow on rounds.  Please contact me if there are any additional neurologic changes.        ----------------------------------------------------------------------------------  ----------------------------------------------------------------------------------  Reason for consult: I was asked by Dr. Frias to evaluate this patient for episode of unresponsiveness.       Chief Complaint:   Patient is not able to give history of event as she does not remember  History is obtained from the patient / chart       History of Present Illness:   This patient is a 35 year old female who presents with period of unresponsiveness.  Records from the emergency room and admission notes report that the patient was found unresponsive in her workplace.  [place]  She was found with her eyes deviated to the right.  She did receive Versed by EMS and Ativan in the emergency room.  Gradually she became more responsive.  Apparently stroke neurology service was initially consulted.  An EEG was started last evening.  Video EEG  reveals no epileptic abnormality-mainly medication effect and sleep overnight.  At this time patient is sleepy.  At the time of hospitalist evaluation at approximately 6:54 PM, the patient was noted to be responsive to some questions  Overnight, she was noted to be sleepy but otherwise no seizure activity    The patient denies any stressors at work or at home.  She reports that her family including 9 children have been healthy.  There is no family history of seizures  She reports that she slept well the night before.       Past Medical History:     Past Medical History:   Diagnosis Date     Dry eyes, bilateral          Past Surgical History:   No past surgical history on file.  None       Social History:     Social History     Socioeconomic History     Marital status:      Spouse name: Not on file     Number of children: Not on file     Years of education: Not on file     Highest education level: Not on file   Occupational History     Not on file   Social Needs     Financial resource strain: Not on file     Food insecurity:     Worry: Not on file     Inability: Not on file     Transportation needs:     Medical: Not on file     Non-medical: Not on file   Tobacco Use     Smoking status: Never Smoker   Substance and Sexual Activity     Alcohol use: Never     Frequency: Never     Drug use: Never     Sexual activity: Not on file   Lifestyle     Physical activity:     Days per week: Not on file     Minutes per session: Not on file     Stress: Not on file   Relationships     Social connections:     Talks on phone: Not on file     Gets together: Not on file     Attends Synagogue service: Not on file     Active member of club or organization: Not on file     Attends meetings of clubs or organizations: Not on file     Relationship status: Not on file     Intimate partner violence:     Fear of current or ex partner: Not on file     Emotionally abused: Not on file     Physically abused: Not on file     Forced sexual  activity: Not on file   Other Topics Concern     Not on file   Social History Narrative    Patient lives in [place].  She has 9 children aged 18 to 3 years.  She does not have a  or partner.  She states that she takes care of her children by herself.  Her parents are both .  She works in the [place] as a .     Patient denies smoking, no significant alcohol intake, denies illicit drugs use       Family History:     Family History   Problem Relation Age of Onset     No Known Problems Mother      No Known Problems Father      No Known Problems Sister      No Known Problems Brother      Glaucoma No family hx of      Macular Degeneration No family hx of      Amblyopia No family hx of      Reviewed and not felt to be contributory.  Negative for seizures or fainting spells       Home Medications:     None          Allergy:   No Known Allergies       Inpatient Medications:   Scheduled Meds:    levETIRAcetam  500 mg Oral BID     sodium chloride (PF)  3 mL Intracatheter Q8H     PRN Meds: acetaminophen **OR** acetaminophen **OR** acetaminophen, ketorolac, lidocaine 4%, lidocaine (buffered or not buffered), LORazepam, - MEDICATION INSTRUCTIONS -, metoclopramide **OR** metoclopramide, naloxone, ondansetron **OR** ondansetron, prochlorperazine **OR** prochlorperazine **OR** prochlorperazine, sodium chloride (PF)        Review of Systems    The Review of Systems is negative other than noted in the HPI  A comprehensive review of  10 systems was performed and found to be negative except as described in this note  CONSTITUTIONAL: negative for fever, chills, change in weight  INTEGUMENTARY/SKIN: no rash or obvious new lesions  ENT/MOUTH: no sore throat, new sinus pain or nasal drainage, no neck mass noted  RESP: No pleuretic pain, No cough, no hemoptysis, No SOB   CV: negative for chest pain, palpitations or peripheral edema  GI: no nausea, vomiting, change in stools  : no dysuria or  hematuria  MUSCULOSKELETAL: no myalgias, arthralgias or join efffusion  ENDOCRINE: no history of polyuria, polydyspsia or symptoms of thyroid dysfunction  PSYCHIATRIC: no change in mood stable  LYMPHATIC: no new lymphadenopathy  HEME: no bleeding or easy bruisability  NEURO: see HPI       Physical Exam:   Physical Exam   Vitals:  Height:Data Unavailable  Weight:198 lbs 6.62 oz   Temp: 98.2  F (36.8  C) Temp src: Oral BP: 102/48 Pulse: 76 Heart Rate: 86 Resp: 16 SpO2: 100 % O2 Device: None (Room air)    General Appearance:  No acute distress, sleepy.  Neck supple  Neuro:       Mental Status Exam:    Sleepy but arousable, oriented.  Speech is intact.  Requires some use of the phone interpretation but otherwise stands significant amounts of English.  Language is intact       Cranial Nerves:   2 through 12 intact.  Fundus technically difficult           Motor:   Tone bulk and strength intact x4.           Reflexes: Symmetrically intact.  Plantar signs normal       Sensory: Cold and vibratory sense intact x4                   Coordination:   Coordination intact x4       Gait: Unable to be tested-on monitor, sleepy.  Neck: no nuchal rigidity, normal thyroid. No carotid bruits.    Cardiovascular: Regular rate and rhythm, no m/r/g    Extremities: No clubbing, no cyanosis, no edema       Data:   ROUTINE IP LABS   CBC RESULTS:     Recent Labs   Lab 01/31/20  1639   WBC 5.5   RBC 4.31   HGB 9.8*   HCT 31.4*        Basic Metabolic Panel:   Recent Labs   Lab Test 01/31/20  1639 09/12/19  0002    140   POTASSIUM 3.4 3.4   CHLORIDE 109 109   CO2 26 24   BUN 9 9   CR 0.60 0.52   * 176*   ARIAS 8.6 8.5     Liver panel:  Recent Labs   Lab Test 01/31/20  1639 09/12/19  0002   PROTTOTAL 6.9 7.4   ALBUMIN 3.3* 3.3*   BILITOTAL 0.2 0.2   ALKPHOS 73 71   AST 10 15   ALT 17 17     Thyroid Panel:  Recent Labs   Lab Test 01/31/20  1931   TSH 1.40           IMAGING:   All imaging studies were reviewed personally    MRI  brain:   -Normal

## 2020-02-01 NOTE — PROGRESS NOTES
RECEIVING UNIT ED HANDOFF REVIEW    ED Nurse Handoff Report was reviewed by: Kaia Salcido RN on January 31, 2020 at 6:38 PM

## 2020-02-01 NOTE — PLAN OF CARE
Pt here with possible seizures. Arouses to voice, intermittent lethargic. C/O fatigue. Oriented x4. Neuros intact ex needs cues. VSS on RA. Tele NSR. Regular diet, thin liquids. Takes pills whole.  Denies pain. Pt scoring Green on the Aggression Stop Light Tool. EEG completed. Discharge pending more observation.

## 2020-02-01 NOTE — PLAN OF CARE
Pt here with unresponsive episode likely new onset seizure. Disoriented to time. lethargic. VSS. Tele SR. Regular  diet with thin liquids. Bedrest. Denies pain. Pt scoring green on the Aggression Stop Light Tool. Plan continue with 24 hr EEG video monitoring. Neurology consult tomorrow. MRI result pending. Discharge pending.

## 2020-02-02 LAB
ANION GAP SERPL CALCULATED.3IONS-SCNC: 6 MMOL/L (ref 3–14)
BUN SERPL-MCNC: 8 MG/DL (ref 7–30)
CALCIUM SERPL-MCNC: 8.3 MG/DL (ref 8.5–10.1)
CHLORIDE SERPL-SCNC: 116 MMOL/L (ref 94–109)
CO2 SERPL-SCNC: 20 MMOL/L (ref 20–32)
CREAT SERPL-MCNC: 0.48 MG/DL (ref 0.52–1.04)
ERYTHROCYTE [DISTWIDTH] IN BLOOD BY AUTOMATED COUNT: 16.2 % (ref 10–15)
GFR SERPL CREATININE-BSD FRML MDRD: >90 ML/MIN/{1.73_M2}
GLUCOSE BLDC GLUCOMTR-MCNC: 146 MG/DL (ref 70–99)
GLUCOSE SERPL-MCNC: 118 MG/DL (ref 70–99)
HCT VFR BLD AUTO: 35.1 % (ref 35–47)
HGB BLD-MCNC: 11 G/DL (ref 11.7–15.7)
INR PPP: 1.04 (ref 0.86–1.14)
MCH RBC QN AUTO: 22.7 PG (ref 26.5–33)
MCHC RBC AUTO-ENTMCNC: 31.3 G/DL (ref 31.5–36.5)
MCV RBC AUTO: 72 FL (ref 78–100)
PLATELET # BLD AUTO: 460 10E9/L (ref 150–450)
POTASSIUM SERPL-SCNC: 4.2 MMOL/L (ref 3.4–5.3)
RBC # BLD AUTO: 4.85 10E12/L (ref 3.8–5.2)
SODIUM SERPL-SCNC: 142 MMOL/L (ref 133–144)
WBC # BLD AUTO: 4.6 10E9/L (ref 4–11)

## 2020-02-02 PROCEDURE — 99233 SBSQ HOSP IP/OBS HIGH 50: CPT | Performed by: INTERNAL MEDICINE

## 2020-02-02 PROCEDURE — 25000128 H RX IP 250 OP 636: Performed by: NURSE PRACTITIONER

## 2020-02-02 PROCEDURE — 99232 SBSQ HOSP IP/OBS MODERATE 35: CPT | Performed by: NURSE PRACTITIONER

## 2020-02-02 PROCEDURE — 85027 COMPLETE CBC AUTOMATED: CPT | Performed by: INTERNAL MEDICINE

## 2020-02-02 PROCEDURE — 25000128 H RX IP 250 OP 636: Performed by: HOSPITALIST

## 2020-02-02 PROCEDURE — 80048 BASIC METABOLIC PNL TOTAL CA: CPT | Performed by: INTERNAL MEDICINE

## 2020-02-02 PROCEDURE — 99207 ZZC MOONLIGHTING INDICATOR: CPT | Performed by: INTERNAL MEDICINE

## 2020-02-02 PROCEDURE — 00000146 ZZHCL STATISTIC GLUCOSE BY METER IP

## 2020-02-02 PROCEDURE — 25000132 ZZH RX MED GY IP 250 OP 250 PS 637: Performed by: HOSPITALIST

## 2020-02-02 PROCEDURE — 85610 PROTHROMBIN TIME: CPT | Performed by: INTERNAL MEDICINE

## 2020-02-02 PROCEDURE — 25800030 ZZH RX IP 258 OP 636: Performed by: NURSE PRACTITIONER

## 2020-02-02 PROCEDURE — 36415 COLL VENOUS BLD VENIPUNCTURE: CPT | Performed by: INTERNAL MEDICINE

## 2020-02-02 PROCEDURE — 12000000 ZZH R&B MED SURG/OB

## 2020-02-02 RX ADMIN — SODIUM CHLORIDE: 9 INJECTION, SOLUTION INTRAVENOUS at 02:06

## 2020-02-02 RX ADMIN — SODIUM CHLORIDE: 9 INJECTION, SOLUTION INTRAVENOUS at 12:56

## 2020-02-02 RX ADMIN — ACETAMINOPHEN 650 MG: 325 TABLET, FILM COATED ORAL at 20:24

## 2020-02-02 RX ADMIN — LORAZEPAM 2 MG: 2 INJECTION INTRAMUSCULAR; INTRAVENOUS at 11:46

## 2020-02-02 RX ADMIN — SODIUM CHLORIDE: 9 INJECTION, SOLUTION INTRAVENOUS at 22:03

## 2020-02-02 RX ADMIN — LEVETIRACETAM 500 MG: 5 INJECTION INTRAVENOUS at 09:03

## 2020-02-02 ASSESSMENT — ACTIVITIES OF DAILY LIVING (ADL)
ADLS_ACUITY_SCORE: 19
ADLS_ACUITY_SCORE: 19
ADLS_ACUITY_SCORE: 18
ADLS_ACUITY_SCORE: 19
ADLS_ACUITY_SCORE: 18
ADLS_ACUITY_SCORE: 19

## 2020-02-02 NOTE — PROGRESS NOTES
"[place] [facility] brief RRT note:    Acute encephalopathy possibly 2/2 non-epileptic spell vs ?.  Alternatively considered seizure, acute intracranial process, hypercapnia, hypoglycemia, ingestion, complex migraine  Received bedside handoff from [place] [facility], Janelle VITAL, who was called at 1815 for acute AMS.  Per colleague, nursing and family present at pt's bedside, pt stated \"my tongue is feeling tired\", \"my head hurts\", \"hold my head\" when at that time pt became acutely unresponsive with her head falling to the right, with noted deviated bilateral eyes up to the right, PERRL 2mm, no tonic-clonic seizure activity noted by nursing or family.  Of note, when evaluating pt's pupils, per nursing, it appeared pt was attempting to focus; however, would develop a dysconjugate gaze intermittently.  Pt remains hemodynamically stable, with noted soft BP, SBP 90s, MAPs 70s, telemetry NSR HR 70s, RR 10s, O2 sats 98% on RA.  Pt was noted to be \"drooling\" minimally, not arousing to deep sternal rub or painful stimuli in all extremities, all extremities flaccid, head with move without resistance when attempting to move pt's head midline.  Pt had been administered 2 mg IV ativan prior to arrival.  After ~ 5 min post ativan administration, pt now moving noted to be spontaneously moving her LLE, moving her head minimally, and attempting to open her eyes with eyes no longer deviated up to the right.  Pt would randomly scratch her face with either upper extremity.  Nursing noted pt was not incontinent at time of unresponsiveness, solely noted menses.  Pt's lung sounds clear throughout, normal S1S2, no murmur, rub or gallop noted, active bowel sounds, soft abdomen, round, nontender to palpation, no guarding or rebound tenderness noted.        After discussion with Dr. Butcher, neurology, given recent EEG noting no epileptic discharges or seizures recorded, less likely seizure at this time.  Given that pt noted headache prior to acute " unresponsiveness, will obtain CT head without to ensure no acute intracranial process.  Will make pt neuro special care overnight.  Will continue pt on telemetry and continuous pulse oximetry at this time.  Pt continues on keppra at this time.    IMAGING: (X-ray/CT/MRI)   Recent Results (from the past 24 hour(s))   MR Brain w/o & w Contrast    Narrative    EXAM: MR BRAIN W/O and W CONTRAST  LOCATION: [facility]  DATE/TIME: 1/31/2020 9:12 PM    INDICATION: Syncope, Unresponsive  COMPARISON: CT head 01/31/2020  CONTRAST: 9 mL Gadavist  TECHNIQUE: Routine multiplanar multisequence head MRI without and with intravenous contrast.    FINDINGS:  INTRACRANIAL CONTENTS: No acute or subacute infarct. No mass, acute hemorrhage, or extra-axial fluid collections. Normal brain parenchymal signal. Normal ventricles and sulci. Normal position of the cerebellar tonsils. No pathologic contrast enhancement.    SELLA: No abnormality accounting for technique.    OSSEOUS STRUCTURES/SOFT TISSUES: Normal marrow signal. The major intracranial vascular flow voids are maintained.     ORBITS: No abnormality accounting for technique.     SINUSES/MASTOIDS: No paranasal sinus mucosal disease. No middle ear or mastoid effusion.       Impression    IMPRESSION:  1.  Normal head MRI.       CT Head w/o Contrast    Narrative    CT SCAN OF THE HEAD WITHOUT CONTRAST   2/1/2020 7:20 PM     HISTORY: Acute unresponsive post headache, eyes deviated up to right;  EEG revealed non-epileptic spells.    TECHNIQUE:  Axial images of the head and coronal reformations without  IV contrast material. Radiation dose for this scan was reduced using  automated exposure control, adjustment of the mA and/or kV according  to patient size, or iterative reconstruction technique.    COMPARISON: Brain MR 1/31/2020.    FINDINGS: There is no evidence of intracranial hemorrhage, mass, acute  infarct or anomaly. The ventricles are normal in size, shape  and  configuration. The brain parenchyma and subarachnoid spaces are  normal.     The visualized portions of the sinuses and mastoids appear normal. The  bony calvarium and bones of the skull base appear intact.       Impression    IMPRESSION:   No evidence of acute intracranial hemorrhage, mass, or  herniation.     Addendum: 2017: Pt with soft BPs post ativan administration, asymptomatic.  Will order gentle fluid IV bolus 500 ml NS now.    Addendum: 2124: Pt's BP remains soft; however, pt asymptomatic.  Pt remains lethargic from presumed previous dosing of ativan; however, arouses without difficulty, conversing with staff.  Staff requested for pt's keppra to be transitioned to IV as pt lethargic.  Will change PO keppra to IV and also place pt on MIVF until tolerating PO intake.  Upon arrival, pt supine in bed, sleeping, lethargic, sluggishly opens eyes to voice.  Pt's radial pulse weak, consistent with soft BPs.  No urine output this evening shift.  Requested for nursing to bladder scan pt.  If low, will give pt additional IVF bolus.  Also, will obtain BMP, Mg, CBC, lactic acid now.  Pt remains afebrile, skin warm, dry, protecting airway on RA.        ZAHRAA Davalos, CNP  [facility]       I spent 45 min at the pt's bedside managing and coordinating pt's overall plan of care.

## 2020-02-02 NOTE — SIGNIFICANT EVENT
[facility]    FALL Note  2/2/2020   Time Called: 11: 25 AM    Assessment & Plan   Unresponsive episode, unclear etiology  Fall, gently lowered to floor without trauma   Called to evaluate Ms. Erazo after fall- she was getting out of bed with family when she began an unresponsive episode. She was safely and gently lowered to the floor by nursing staff. Upon my arrival patient laying on the floor without obvious trauma. Eyes closes, gaze deviated upward and to the right, extremities flaccid. Lifted off floor with Yasmin. Ativan IV given.     Evaluation this admission has not revealed epileptiform EEG changes. Episode yesterday was preceded by headache. Per family today she did not endorse preceding symptoms. Approximately 5- minutes after IV Ativan today she began moving and affirming she can hear us. Her airway, breathing, and circulation remain intact with the episodes.     Family present note Ms. Erazo immigrated to the [place] in [place] with her 9- children. She lived in [place] during the war. Her family does not know if she saw or was a part of traumatic events while living in [place]. Her  is still in [place] and has never joined her. She lost both her parents by the time she was a teenager. Ms. [place] No known recent stress, trauma, or loss. Family notes that she has always done everything on her own and takes care of herself and 9- children without much assistance.     INTERVENTIONS:  - Ativan 2- mg IV  - Psychiatry consult, greatly appreciate their input     Discussed with and defer further cares to Dr. Mcclellan, Hospitalist.     Code Status: Full Code     Janelle Rivera, ZAHRAA, CNP  Hospitalist Service, House Officer  [facility]     Text Page  Pager: [phone]    Allergies   No Known Allergies    Physical Exam   Vital Signs with Ranges:  Temp:  [97.8  F (36.6  C)-98.8  F (37.1  C)] 98.8  F (37.1  C)  Heart Rate:  [75-85] 77  Resp:   [12-18] 16  BP: ()/(36-70) 110/63  SpO2:  [98 %-100 %] 99 %  I/O last 3 completed shifts:  In: 3 [I.V.:3]  Out: 700 [Urine:700]    Constitutional: 35- year old female laying on floor.   Pulmonary: She is not hypoxic. No obvious respiratory distress. Lung fields clear.   Cardiovascular: S1, S2 without obvious murmur, rub, or gallop.   GI: Soft, non-tender, non-distended.   Skin/Integumen: No obvious concerning rashes, lesions, or trauma.   Neuro: Eyes closed with pupils deviated upward and to the right. No extremity movement even to pain. No verbal response.   Psych:  Calm.   Extremities: Well perfused.       IMAGING: (X-ray/CT/MRI)   Recent Results (from the past 24 hour(s))   CT Head w/o Contrast    Narrative    CT SCAN OF THE HEAD WITHOUT CONTRAST   2/1/2020 7:20 PM     HISTORY: Acute unresponsive post headache, eyes deviated up to right;  EEG revealed non-epileptic spells.    TECHNIQUE:  Axial images of the head and coronal reformations without  IV contrast material. Radiation dose for this scan was reduced using  automated exposure control, adjustment of the mA and/or kV according  to patient size, or iterative reconstruction technique.    COMPARISON: Brain MR 1/31/2020.    FINDINGS: There is no evidence of intracranial hemorrhage, mass, acute  infarct or anomaly. The ventricles are normal in size, shape and  configuration. The brain parenchyma and subarachnoid spaces are  normal.     The visualized portions of the sinuses and mastoids appear normal. The  bony calvarium and bones of the skull base appear intact.       Impression    IMPRESSION:   No evidence of acute intracranial hemorrhage, mass, or  herniation.    GISELLA MORALES MD       CBC with Diff:  Recent Labs   Lab Test 02/02/20  0933   WBC 4.6   HGB 11.0*   MCV 72*   *   INR 1.04        Lactic Acid:    Lab Results   Component Value Date    LACT 1.1 02/01/2020           Comprehensive Metabolic Panel:  Recent Labs   Lab 02/02/20  0933  02/01/20  2252 01/31/20  1931  01/31/20  1639    141  --   --  139   POTASSIUM 4.2 3.9  --   --  3.4   CHLORIDE 116* 113*  --   --  109   CO2 20 24  --   --  26   ANIONGAP 6 4  --   --  4   * 126*  --   --  187*   BUN 8 11  --   --  9   CR 0.48* 0.51*  --   --  0.60   GFRESTIMATED >90 >90  --   --  >90   GFRESTBLACK >90 >90  --   --  >90   ARIAS 8.3* 8.4*  --   --  8.6   MAG  --  2.3 2.1   < >  --    PHOS  --   --  2.9  --   --    PROTTOTAL  --   --   --   --  6.9   ALBUMIN  --   --   --   --  3.3*   BILITOTAL  --   --   --   --  0.2   ALKPHOS  --   --   --   --  73   AST  --   --   --   --  10   ALT  --   --   --   --  17    < > = values in this interval not displayed.       INR:    Recent Labs   Lab Test 02/02/20  0933   INR 1.04     UA:  Recent Labs   Lab 01/31/20  1649   COLOR Yellow   APPEARANCE Clear   URINEGLC 30*   URINEBILI Negative   URINEKETONE Negative   SG 1.010   UBLD Negative   URINEPH 5.5   PROTEIN Negative   NITRITE Negative   LEUKEST Negative   RBCU 0   WBCU 0

## 2020-02-02 NOTE — SIGNIFICANT EVENT
1130 call light on, Staff arrived to answer call light to pt being helped to the bathroom by family, with family holding up patient. Staff assisted pt to the ground. Pt unresponsive at this time. Eyes deviated upward to the right. Blood sugar checked. Pt was breathing, vitally stable.   Tyler MICHEL called for assisted fall.  VSS, O2 100s on RA  House MD arrived to assess.  Pt transferred back into bed around 1142  Ativan given 1148  1153 pt responding to commands, increased alertness.  Neurologist arrived to assess 1210.

## 2020-02-02 NOTE — PLAN OF CARE
Pt here with possible seizures. A&Ox4. Neuros lethargic, following commands, generalized weakness. Q2h neuros overnight. VSS. Tele NSR. Regular diet when alert. Fluids infusing. Bedrest overnight. Denies pain. Pt scoring green on the Aggression Stop Light Tool. Discharge plan pending. Continue to monitor.

## 2020-02-02 NOTE — PROVIDER NOTIFICATION
Paged Guadalupe that pt back to baseline except still lethargic, do you want to continue Q2h neuros?  Orders to switch to q4h neuro checks

## 2020-02-02 NOTE — PLAN OF CARE
Pt here with possible seizures. Arouses to voice, intermittent lethargic. Oriented x4 ex after unresponsive episode. Neuros intact ex lethargic/ drowsy. VSS on RA. Tele NSR. Regular diet, thin liquids. Takes pills whole.  Denies pain. Pt scoring Green on the Aggression Stop Light Tool. EEG completed. Discharge pending. Bedrest.

## 2020-02-02 NOTE — PLAN OF CARE
Pt here with unresponsive episode likely new onset of seizure. Disoriented to time and situation. Lethargic. Arousal to voice. Inconsistent with following command. Slow with speech. Seizure precaution. RRT called this evening due to witnessed episode of unresponsiveness. Low bp increased with 500cc iv bolus. Tele SD. Regular  diet with thin liquids. Bedrest. Bladder scanned at 2227 due to no void was 586cc. Patient later voided 700cc using bedpan. PVR at 2239 was 46cc . Denies pain. Pt scoring green on the Aggression Stop Light Tool. Plan continue to monitor. Discharge pending.

## 2020-02-02 NOTE — PROGRESS NOTES
RRT regarding unresponsive episode at 6: 12 PM. Ativan 2- mg IV given with handoff to SELMA Parker CNP.     ZAHRAA York, CNP  Hospitalist Service, House Officer  [facility]     Text Page  Pager: [phone]

## 2020-02-02 NOTE — PROCEDURES
Procedure Date: 01/31/2020      A 24-HOUR VIDEO ELECTROENCEPHALOGRAM       EEG #[id number]       DATE OF STUDY:  This is day 1 of 24-hour video EEG monitoring on 01/31/2020 at 18:53:55 to 02/01/2020 at 13:04:34.      CLINICAL SUMMARY:  The patient is a 35-year-old female with no significant past medical history, who was brought to ED after she was found unresponsive at her work place.  She was noted to have her eyes deviated to the right.  She received Versed.  CT head and cervical spine were unrevealing.  The patient was admitted for likely seizures.  EEG was performed to evaluate for seizures.     TECHNICAL SUMMARY: This continuous video- EEG monitoring procedure was performed with 23 scalp electrodes in 10-20 electrode system placement, and additional scalp, precordial and other surface electrodes used for electrical referencing and artifact detection.  Video monitoring was utilized and periodically reviewed by EEG technologists and the physician for electroclinical correlations.     INTERICTAL ACTIVITIES:  During quiet wakefulness, there was well-formed, moderate amplitude 9 Hz alpha activity over the posterior head regions, which was symmetric and attenuated by eye opening.  Drowsiness was manifested as dropout of the posterior dominant rhythm, diffuse theta activity, and horizontal eye movements.  Stage II sleep was manifested as vertex waves, symmetric sleep spindles, and K complexes.  Excess fast beta activity was seen during the first portion of the recording, maximal in the anterior regions.  No epileptiform discharges were present.       CLINICAL/ICTAL EVENTS:  No electrographic or clinical seizures were recorded.      IMPRESSION:  Essentially normal video EEG.  Excess fast beta activity could be seen as a result of medication effect.  No epileptiform discharges were present.  No electrographic or clinical seizures were recorded.  Clinical correlation advised.         ULISES PAUL MD              D: 2020   T: 2020   MT: TAVON      Name:     JOEL ENRIQUEZ   MRN:      [id number]        Account:        [id number]   :      1985           Procedure Date: 2020      Document: [id number]

## 2020-02-02 NOTE — PROGRESS NOTES
[facility]    Hospitalist Progress Note    Assessment & Plan   Indy Erazo is a 35 year old female with no significant past medical history who was brought into the ED after she was found unresponsive at her workplace.  Remained unresponsive in the ED for about 45 minutes with eyes were deviated to the right, no tonic-clonic activity. Received Versed via EMS, given Ativan in the ED after which she became responsive.  CT head and cervical spine unrevealing.  Admitted as inpatient for evaluation of likely seizure.     Unresponsive episode, likely new onset seizure  Found down, unresponsive at her workplace.  Eyes noted to be deviated to the right, no tonic-clonic seizure activity noted. Given 5 mg of Versed by EMS.  Continued to be unresponsive for about 45 minutes into her ED course.  Received Narcan with no response. Then received 2 mg Ativan after which she became more responsive.  Afebrile.  Labs mostly unremarkable other than hemoglobin of 9.8, UA negative for infection, negative ethanol, urine tox screen positive for benzodiazepines [had received Ativan prior].  CT cervical spine with no evidence of fracture.  Did show markedly enlarged left thyroid gland containing thyroid nodule measuring up to 4.7 cm. Head CT with no acute pathology. Patient was seen by neuro critical care. Brain MRI negative.   -on 2/1 at 7 pm, another episode of unresponsiveness. After discussion with Dr. Butcher, neurology, given recent EEG noting no epileptic discharges or seizures recorded, less likely seizure at this time. Head CT unremarkable.   -Neurology following, cont Keppra. Appreciate recommendations today.   -Video EEG monitoring ordered by Neurology, no seizures noted.   -MRI brain w/wo contrast: normal.   -cont Keppra neurology started.   -PRN Ativan for seizures  -Allow regular diet as patient is awake and responsive now  -Seizure precautions     Thyroid nodule CT cervical spine  Showed markedly enlarged left  thyroid gland containing thyroid nodule measuring up to 4.7 cm.  -TSH, FT3, FT4 appears unremarkable.   -Will need thyroid ultrasound and endocrinology referral later for further evaluation.     DVT Prophylaxis: Low Risk/Ambulatory with no VTE prophylaxis indicated  Code Status: Full Code    Expected discharge: 1-2 days, recommended to prior living arrangement once mental status at baseline and Neurology work-up completed.  Forest Mcclellan MD   Text Page (7am to 6pm)    Interval History   On 2/1 at 7 pm, another episode of unresponsiveness. No fevers or chills. No chest pain or SOB. Answered patient questions. Neurology following. Cont Keppra. Head CT and MRI unremarkable. EEG unremarkable.     -Data reviewed today: I reviewed all new labs and imaging results over the last 24 hours. I personally reviewed the EKG tracing showing SR.    Physical Exam   Temp: 98.8  F (37.1  C) Temp src: Oral BP: 110/63   Heart Rate: 77 Resp: 16 SpO2: 99 % O2 Device: None (Room air)    Vitals:    01/31/20 1613   Weight: 90 kg (198 lb 6.6 oz)     Vital Signs with Ranges  Temp:  [97.8  F (36.6  C)-98.8  F (37.1  C)] 98.8  F (37.1  C)  Heart Rate:  [75-86] 77  Resp:  [12-18] 16  BP: ()/(36-70) 110/63  SpO2:  [98 %-100 %] 99 %  I/O last 3 completed shifts:  In: 3 [I.V.:3]  Out: 700 [Urine:700]    Constitutional: Awake, groggy, cooperative, no apparent distress.  Eyes: no icterus, EOMs intact  HEENT: Moist mucous membranes  Respiratory: Clear to auscultation bilaterally, no crackles or wheezing.  Cardiovascular: Regular rate and rhythm, normal S1 and S2, and no murmur noted.  GI: Soft, non-distended, non-tender, normal bowel sounds.  Skin: No rashes, no cyanosis, no edema.  Musculoskeletal: No joint swelling, erythema or tenderness.  Neurologic: Drowsy but easily arousable and is oriented and engages in appropriate conversation, no facial asymmetry, moving all extremities, fluent speech  Psychiatric: Calm and pleasant, no obvious anxiety  or depression.     Medications     - MEDICATION INSTRUCTIONS -       sodium chloride 100 mL/hr at 02/02/20 0206       levETIRAcetam  500 mg Intravenous Q12H     sodium chloride (PF)  3 mL Intracatheter Q8H       Data   Recent Labs   Lab 02/02/20  0933 02/01/20  2252 01/31/20  1639   WBC 4.6 5.5 5.5   HGB 11.0* 11.1* 9.8*   MCV 72* 73* 73*   * 466* 411   INR 1.04  --   --     141 139   POTASSIUM 4.2 3.9 3.4   CHLORIDE 116* 113* 109   CO2 20 24 26   BUN 8 11 9   CR 0.48* 0.51* 0.60   ANIONGAP 6 4 4   ARIAS 8.3* 8.4* 8.6   * 126* 187*   ALBUMIN  --   --  3.3*   PROTTOTAL  --   --  6.9   BILITOTAL  --   --  0.2   ALKPHOS  --   --  73   ALT  --   --  17   AST  --   --  10       Imaging:   Recent Results (from the past 24 hour(s))   CT Head w/o Contrast    Narrative    CT SCAN OF THE HEAD WITHOUT CONTRAST   2/1/2020 7:20 PM     HISTORY: Acute unresponsive post headache, eyes deviated up to right;  EEG revealed non-epileptic spells.    TECHNIQUE:  Axial images of the head and coronal reformations without  IV contrast material. Radiation dose for this scan was reduced using  automated exposure control, adjustment of the mA and/or kV according  to patient size, or iterative reconstruction technique.    COMPARISON: Brain MR 1/31/2020.    FINDINGS: There is no evidence of intracranial hemorrhage, mass, acute  infarct or anomaly. The ventricles are normal in size, shape and  configuration. The brain parenchyma and subarachnoid spaces are  normal.     The visualized portions of the sinuses and mastoids appear normal. The  bony calvarium and bones of the skull base appear intact.       Impression    IMPRESSION:   No evidence of acute intracranial hemorrhage, mass, or  herniation.    GISELLA MORALES MD

## 2020-02-02 NOTE — PROGRESS NOTES
[facility] [place]  Neurology Daily Note      Admission Date:1/31/2020   Date of service: 02/02/2020   Hospital Day: 3                                                   Assessment and Plan:   #.  Periods of  Unresponsiveness, description of events c/w psychogenic/nonepileptic events.  Now recovered other than sleepiness from lorazepam.  Video EEG yesterday was normal, however did not record actual event.      Continue observation overnight.  Psychiatry and  evaluation  As events no c/w seizure, will discontinue levetiracetam  Plan/results of evaluation discussed with patient with grandmother at bedside, they agree that patient is exhausted and stressed with high rent payment and single parent of 9 children and working full time.  Withhold lorazepam for any future events.    Contact neurology/medical service if changes in oxygenation/respiratory or VS.      If future events can consider restart of video EEG tomorrow (only available to start on weekend if emergency)  Dr. Xiomara Eduardo to follow tomorrow     #. DVT Prophylaxis  --Mechanical /per hospitalist service      Interval History:   2 additional events since yesterday, one at 7pm last pm.  Unresponsiveness, no response to noxious stimuli.  After lorazepam, started moving legs and 1 hour later/conversing with family.  Another episode this am enroute to [place] Now sleepy after lorazpam 30min ago but able to answer questions.        Review of Systems:   Not able to participate       Medications:   Scheduled Meds:    levETIRAcetam  500 mg Intravenous Q12H     sodium chloride (PF)  3 mL Intracatheter Q8H     PRN Meds: acetaminophen **OR** acetaminophen **OR** acetaminophen, ketorolac, lidocaine 4%, lidocaine (buffered or not buffered), - MEDICATION INSTRUCTIONS -, metoclopramide **OR** metoclopramide, naloxone, ondansetron **OR** ondansetron, prochlorperazine **OR** prochlorperazine **OR**  prochlorperazine, sodium chloride (PF)        Physical Exam:   Vitals: Temp: 98.8  F (37.1  C) Temp src: Oral BP: 103/64   Heart Rate: 80 Resp: 16 SpO2: 96 % O2 Device: None (Room air)    Vital Signs with Ranges: Temp:  [97.8  F (36.6  C)-98.8  F (37.1  C)] 98.8  F (37.1  C)  Heart Rate:  [60-85] 80  Resp:  [12-18] 16  BP: ()/(36-74) 103/64  SpO2:  [96 %-100 %] 96 %    General Appearance:  No acute distress, sleepy  Neuro:       Mental Status Exam:    arousable and able to follow commands.  Language intact for few words       Cranial Nerves:   2-12 intact           Motor:   Tone bulk and strength intact           Reflexes:  Ix4  Plantar nl       Sensory:  Intact x4 grossly                   Coordination:   Intact x4       Gait:  Unable to assess    Cardiovascular: Regular rate and rhythm, no m/r/g  Extremities: No clubbing, no cyanosis, no edema       Data:   ROUTINE IP LABS (Last 3results)  CBC RESULTS:     Recent Labs   Lab Test 02/02/20  0933 02/01/20 2252 01/31/20  1639   WBC 4.6 5.5 5.5   RBC 4.85 4.92 4.31   HGB 11.0* 11.1* 9.8*   HCT 35.1 35.8 31.4*   * 466* 411     Basic Metabolic Panel:  Recent Labs   Lab Test 02/02/20  0933 02/01/20 2252 01/31/20  1639    141 139   POTASSIUM 4.2 3.9 3.4   CHLORIDE 116* 113* 109   CO2 20 24 26   BUN 8 11 9   CR 0.48* 0.51* 0.60   * 126* 187*   ARIAS 8.3* 8.4* 8.6     Liver panel:  Recent Labs   Lab Test 01/31/20  1639 09/12/19  0002   PROTTOTAL 6.9 7.4   ALBUMIN 3.3* 3.3*   BILITOTAL 0.2 0.2   ALKPHOS 73 71   AST 10 15   ALT 17 17     INR:  Recent Labs   Lab Test 02/02/20  0933   INR 1.04      Thyroid Panel:  Recent Labs   Lab Test 01/31/20  1931   TSH 1.40   FT3 2.4           IMAGING:   All imaging studies were reviewed personally  EXAM: MR BRAIN W/O and W CONTRAST  LOCATION: [facility]  DATE/TIME: 1/31/2020 9:12 PM     INDICATION: Syncope, Unresponsive  COMPARISON: CT head 01/31/2020  CONTRAST: 9 mL Gadavist  TECHNIQUE: Routine  multiplanar multisequence head MRI without and with intravenous contrast.     FINDINGS:  INTRACRANIAL CONTENTS: No acute or subacute infarct. No mass, acute hemorrhage, or extra-axial fluid collections. Normal brain parenchymal signal. Normal ventricles and sulci. Normal position of the cerebellar tonsils. No pathologic contrast enhancement.     SELLA: No abnormality accounting for technique.     OSSEOUS STRUCTURES/SOFT TISSUES: Normal marrow signal. The major intracranial vascular flow voids are maintained.      ORBITS: No abnormality accounting for technique.      SINUSES/MASTOIDS: No paranasal sinus mucosal disease. No middle ear or mastoid effusion.                                                                       IMPRESSION:  1.  Normal head MRI*        TIME     35minutes Evaluation/managment time on floor.

## 2020-02-02 NOTE — PROVIDER NOTIFICATION
Web paged house Dr Parker regarding patient low bp of 82/36 and she called back and ordered 500cc iv bolus.

## 2020-02-03 VITALS
WEIGHT: 198.41 LBS | HEART RATE: 76 BPM | TEMPERATURE: 98.8 F | SYSTOLIC BLOOD PRESSURE: 89 MMHG | RESPIRATION RATE: 16 BRPM | DIASTOLIC BLOOD PRESSURE: 54 MMHG | OXYGEN SATURATION: 99 %

## 2020-02-03 LAB
ANION GAP SERPL CALCULATED.3IONS-SCNC: 5 MMOL/L (ref 3–14)
BUN SERPL-MCNC: 7 MG/DL (ref 7–30)
CALCIUM SERPL-MCNC: 8.5 MG/DL (ref 8.5–10.1)
CHLORIDE SERPL-SCNC: 113 MMOL/L (ref 94–109)
CO2 SERPL-SCNC: 21 MMOL/L (ref 20–32)
CREAT SERPL-MCNC: 0.47 MG/DL (ref 0.52–1.04)
ERYTHROCYTE [DISTWIDTH] IN BLOOD BY AUTOMATED COUNT: 16 % (ref 10–15)
GFR SERPL CREATININE-BSD FRML MDRD: >90 ML/MIN/{1.73_M2}
GLUCOSE SERPL-MCNC: 152 MG/DL (ref 70–99)
HCT VFR BLD AUTO: 34.9 % (ref 35–47)
HGB BLD-MCNC: 10.9 G/DL (ref 11.7–15.7)
INR PPP: 1.02 (ref 0.86–1.14)
LACTATE BLD-SCNC: 1.6 MMOL/L (ref 0.7–2)
MCH RBC QN AUTO: 22.6 PG (ref 26.5–33)
MCHC RBC AUTO-ENTMCNC: 31.2 G/DL (ref 31.5–36.5)
MCV RBC AUTO: 72 FL (ref 78–100)
PLATELET # BLD AUTO: 451 10E9/L (ref 150–450)
POTASSIUM SERPL-SCNC: 4.2 MMOL/L (ref 3.4–5.3)
RBC # BLD AUTO: 4.82 10E12/L (ref 3.8–5.2)
SODIUM SERPL-SCNC: 139 MMOL/L (ref 133–144)
WBC # BLD AUTO: 3.6 10E9/L (ref 4–11)

## 2020-02-03 PROCEDURE — 80048 BASIC METABOLIC PNL TOTAL CA: CPT | Performed by: INTERNAL MEDICINE

## 2020-02-03 PROCEDURE — 40000061 ZZH STATISTIC EEG TIME EA 10 MIN

## 2020-02-03 PROCEDURE — 83605 ASSAY OF LACTIC ACID: CPT | Performed by: INTERNAL MEDICINE

## 2020-02-03 PROCEDURE — 95700 EEG CONT REC W/VID EEG TECH: CPT

## 2020-02-03 PROCEDURE — 95711 VEEG 2-12 HR UNMONITORED: CPT

## 2020-02-03 PROCEDURE — 36415 COLL VENOUS BLD VENIPUNCTURE: CPT | Performed by: INTERNAL MEDICINE

## 2020-02-03 PROCEDURE — 99222 1ST HOSP IP/OBS MODERATE 55: CPT | Performed by: PSYCHIATRY & NEUROLOGY

## 2020-02-03 PROCEDURE — 99239 HOSP IP/OBS DSCHRG MGMT >30: CPT | Performed by: INTERNAL MEDICINE

## 2020-02-03 PROCEDURE — 85027 COMPLETE CBC AUTOMATED: CPT | Performed by: INTERNAL MEDICINE

## 2020-02-03 PROCEDURE — 85610 PROTHROMBIN TIME: CPT | Performed by: INTERNAL MEDICINE

## 2020-02-03 PROCEDURE — 25800030 ZZH RX IP 258 OP 636: Performed by: NURSE PRACTITIONER

## 2020-02-03 RX ORDER — LORAZEPAM 0.5 MG/1
0.5 TABLET ORAL 3 TIMES DAILY PRN
Status: DISCONTINUED | OUTPATIENT
Start: 2020-02-03 | End: 2020-02-03 | Stop reason: HOSPADM

## 2020-02-03 RX ADMIN — SODIUM CHLORIDE: 9 INJECTION, SOLUTION INTRAVENOUS at 08:09

## 2020-02-03 ASSESSMENT — ACTIVITIES OF DAILY LIVING (ADL)
ADLS_ACUITY_SCORE: 19
ADLS_ACUITY_SCORE: 17

## 2020-02-03 NOTE — PROGRESS NOTES
[facility]    Neurology Progress Note    Date of Service (when I saw the patient): 02/03/2020     Today's developments: Event last night.  Doing well today.  Requesting to leave today.  I would prefer to get longer time on EEG and try to capture spell, but per patient request okay to DC today.    Assessment & Plan   Indy Erazo is a 35 year old female who was admitted on 1/31/2020 for spells of LOC.  Probably nonepileptic event versus catatonia after hearing upsetting information about a person's death.  I cannot give a definite diagnosis at this time as the events were not captured on EEG.    --Keppra was stopped. Do not restart  --I discussed loss of consciousness spells safety with her.  [place] state laws no driving for 3 months after a possible seizure.  I recommended that she likely can restart after 3 weeks if she has no more events.  However it is up to the DMV to decide that ultimately.  No baths alone.  Be careful on stairs.  I do agree with being off work for 1 week to convalesce.  --If no further spells, she does not need to follow-up.  If she does have further spells, she should call the clinic right away.      I discussed the above diagnosis, assessment, testing, and results with the patient via an in room .  Total time: 35  Minutes, with more than 50% of the time counseling the patient or coordination of care.     Xiomara Chang MD          Interval History   No events since last night.  Patient feels good and feels back to her normal self.  She states the first event happened right after someone told her about a death of a friend.  She wants to leave today.  She has 9 kids and needs to take care of them.  She works in cleaning.    Physical Exam   Temp: 98.8  F (37.1  C) Temp src: Oral BP: (!) 89/54   Heart Rate: 85 Resp: 16 SpO2: 99 % O2 Device: None (Room air)    Vitals:    01/31/20 1613   Weight: 90 kg (198 lb 6.6 oz)     Vital Signs with Ranges  Temp:  [98.3   F (36.8  C)-99.1  F (37.3  C)] 98.8  F (37.1  C)  Heart Rate:  [71-85] 85  Resp:  [16] 16  BP: ()/(49-85) 89/54  SpO2:  [98 %-100 %] 99 %  I/O last 3 completed shifts:  In: 671 [I.V.:671]  Out: 1050 [Urine:1050]      General Appearance:  No acute distress  Neuro:       Mental Status Exam:    Awake alert fully oriented, normal affect.       Cranial Nerves:   Extraocular movements intact face equal and symmetric speech normal facial sensation normal bilaterally           Motor:   5 out of 5 bilateral upper extremities and bilateral lower extremities           Sensory: Normal light touch x4                   Coordination:   Finger-nose-finger normal bilaterally       Gait: Normal with normal balance.  CV: RRR nl s1, s2  Resp: CTAB    Extremities: No clubbing, no cyanosis, no edema    Medications     - MEDICATION INSTRUCTIONS -         sodium chloride (PF)  3 mL Intracatheter Q8H       Data   Results for orders placed or performed during the hospital encounter of 01/31/20 (from the past 24 hour(s))   Basic metabolic panel   Result Value Ref Range    Sodium 139 133 - 144 mmol/L    Potassium 4.2 3.4 - 5.3 mmol/L    Chloride 113 (H) 94 - 109 mmol/L    Carbon Dioxide 21 20 - 32 mmol/L    Anion Gap 5 3 - 14 mmol/L    Glucose 152 (H) 70 - 99 mg/dL    Urea Nitrogen 7 7 - 30 mg/dL    Creatinine 0.47 (L) 0.52 - 1.04 mg/dL    GFR Estimate >90 >60 mL/min/[1.73_m2]    GFR Estimate If Black >90 >60 mL/min/[1.73_m2]    Calcium 8.5 8.5 - 10.1 mg/dL   CBC with platelets   Result Value Ref Range    WBC 3.6 (L) 4.0 - 11.0 10e9/L    RBC Count 4.82 3.8 - 5.2 10e12/L    Hemoglobin 10.9 (L) 11.7 - 15.7 g/dL    Hematocrit 34.9 (L) 35.0 - 47.0 %    MCV 72 (L) 78 - 100 fl    MCH 22.6 (L) 26.5 - 33.0 pg    MCHC 31.2 (L) 31.5 - 36.5 g/dL    RDW 16.0 (H) 10.0 - 15.0 %    Platelet Count 451 (H) 150 - 450 10e9/L   INR   Result Value Ref Range    INR 1.02 0.86 - 1.14   Lactic acid level STAT   Result Value Ref Range    Lactate for Sepsis  Protocol 1.6 0.7 - 2.0 mmol/L         Images and Procedures:  I personally reviewed the images of the following studies:  EEG: no abnormalities

## 2020-02-03 NOTE — PLAN OF CARE
Pt here with possible seizures. A&Ox4. Neuros intact except lethargic, follows all commands. VSS. Tele NSR. Regular diet, thin liquids. Takes pills whole with water. Bedrest overnight. Seizure precautions maintained. Denies pain. Pt scoring green on the Aggression Stop Light Tool. Continue to monitor.

## 2020-02-03 NOTE — DISCHARGE SUMMARY
[facility]  Discharge Summary        Indy Erazo MRN# [id number]   YOB: 1985 Age: 35 year old     Date of Admission:  1/31/2020  Date of Discharge:  2/3/2020  Admitting Physician:  Elizabeth Frias MD  Discharge Physician: Felipa Parekh MD  Discharging Service: Intensivist     Primary Provider: Clinic, [facility]  Primary Care Physician Phone Number: [phone]         Discharge Diagnoses/Problem Oriented Hospital Course (Providers):    Indy Erazo was admitted on 1/31/2020 by Elizabeth Frias MD and I would refer you to their history and physical.  The following problems were addressed during her hospitalization:    Assessment & Plan     Indy Erazo is a 35 year old female with no significant past medical history who was brought into the ED after she was found unresponsive at her workplace.  Remained unresponsive in the ED for about 45 minutes with eyes were deviated to the right, no tonic-clonic activity. Received Versed via EMS, given Ativan in the ED after which she became responsive.  CT head and cervical spine unrevealing.  Admitted as inpatient for evaluation of likely seizure.     Unresponsive episode most likely stress induced with negative work up:    Found down, unresponsive at her workplace.  Eyes noted to be deviated to the right, no tonic-clonic seizure activity noted. Given 5 mg of Versed by EMS.  Continued to be unresponsive for about 45 minutes into her ED course.  Received Narcan with no response. Then received 2 mg Ativan after which she became more responsive.  Afebrile.  Labs mostly unremarkable other than hemoglobin of 9.8, UA negative for infection, negative ethanol, urine tox screen positive for benzodiazepines [had received Ativan prior].  CT cervical spine with no evidence of fracture.  Did show markedly enlarged left thyroid gland containing thyroid nodule measuring up to 4.7 cm. Head CT with no acute pathology. Patient was seen by neuro  critical care. Brain MRI negative.   -on  at 7 pm, another episode of unresponsiveness. After discussion with Dr. Butcher, neurology, given recent EEG noting no epileptic discharges or seizures recorded, less likely seizure at this time. Head CT unremarkable.   -Neurology following, Keppra which was started initially was stopped by neurology when the EEG and continuous EEG returned negative  -Video EEG monitoring ordered by Neurology, no seizures noted.   -MRI brain w/wo contrast: normal.   -PRN Ativan for seizures  -Seizure precautions  Psychiatric consulted and patient will benefit from outpatient psychiatric follow-up  Her symptoms were thought to be stress-induced in the setting of her having 9 children and being a single mom and 1 of her relatives  recently it seems so the death and use was not shocked to her that might have contributed to her passing out as per the friend who is in the room today     Thyroid nodule CT cervical spine  Showed markedly enlarged left thyroid gland containing thyroid nodule measuring up to 4.7 cm.  -TSH, FT3, FT4 appears unremarkable.   -Will need thyroid ultrasound and endocrinology referral later for further evaluation.     DVT Prophylaxis: Low Risk/Ambulatory with no VTE prophylaxis indicated  Code Status: Full Code     Expected discharge: Home today    Felipa Parekh MD   Page [phone](7AM-6PM)          Code Status:      Full Code        Brief Hospital Stay Summary Sent Home With Patient in AVS:        Reason for your hospital stay      Unresponsive epeisode most likley from stress induced. Work up was   negative                 Important Results:      See below        Pending Results:        Unresulted Labs Ordered in the Past 30 Days of this Admission     No orders found from 2020 to 2020.            Discharge Instructions and Follow-Up:      Follow-up Appointments     Follow-up and recommended labs and tests       Follow up with primary care provider, [facility],   within 7 days for hospital follow- up.  No follow up labs or test are   needed.  F/u with endocrinology in 2-3weeks for thyroid nodule               Discharge Disposition:      Discharged to home        Discharge Medications:      There are no discharge medications for this patient.        Allergies:       No Known Allergies        Consultations This Hospital Stay:      Consultation during this admission received from neurology        Condition and Physical on Discharge:      Discharge condition: Stable   Vitals: Blood pressure (!) 89/54, pulse 76, temperature 98.8  F (37.1  C), temperature source Oral, resp. rate 16, weight 90 kg (198 lb 6.6 oz), SpO2 99 %.     Constitutional: Alert and awake    Lungs: CTA   Cardiovascular: RRR   Abdomen: Soft, NT, ND, BS+   Skin: Warm and dry    Other:          Discharge Time:      Greater than 30 minutes.        Image Results From This Hospital Stay (For Non-EPIC Providers):        Results for orders placed or performed during the hospital encounter of 01/31/20   CT Head w/o Contrast    Narrative    CT SCAN OF THE HEAD WITHOUT CONTRAST   1/31/2020 4:59 PM     HISTORY: Altered mental status.    TECHNIQUE: Axial images of the head and coronal reformations without  IV contrast material. Radiation dose for this scan was reduced using  automated exposure control, adjustment of the mA and/or kV according  to patient size, or iterative reconstruction technique.    COMPARISON: None.    FINDINGS: There is no evidence of intracranial hemorrhage, mass, acute  infarct or anomaly. The ventricles are normal in size, shape and  configuration. The brain parenchyma and subarachnoid spaces are  normal.     The visualized portions of the sinuses and mastoids appear normal. The  bony calvarium and bones of the skull base appear intact.       Impression    IMPRESSION: No evidence of acute intracranial hemorrhage, mass, or  herniation.      GISELLA MORALES MD   Cervical  spine CT w/o contrast    Narrative    CT CERVICAL SPINE WITHOUT CONTRAST   1/31/2020 5:00 PM     HISTORY: Fall, altered mental status.     TECHNIQUE: Axial images of the cervical spine were obtained without  intravenous contrast. Multiplanar reformations were performed.   Radiation dose for this scan was reduced using automated exposure  control, adjustment of the mA and/or kV according to patient size, or  iterative reconstruction technique.    COMPARISON: None.    FINDINGS: No evidence of fracture. No prevertebral soft tissue  swelling. Alignment is normal. Vertebral body height is maintained. No  destructive osseous lesions. No significant loss of intervertebral  disc space. No significant spinal canal or neural foraminal narrowing.  Large right cervical rib from the C7 vertebral body.    Visualized paraspinous tissues: Markedly enlarged left thyroid gland  containing a nodule measuring up to at least 4.7 cm.      Impression    IMPRESSION:   1. No evidence of acute fracture or subluxation in the cervical spine.  2. Markedly enlarged left thyroid gland containing a thyroid nodule  measuring up to at least 4.7 cm. Recommend further evaluation with  thyroid ultrasound on a nonemergent basis.          GISELLA MORALES MD   MR Brain w/o & w Contrast    Narrative    EXAM: MR BRAIN W/O and W CONTRAST  LOCATION: [facility]  DATE/TIME: 1/31/2020 9:12 PM    INDICATION: Syncope, Unresponsive  COMPARISON: CT head 01/31/2020  CONTRAST: 9 mL Gadavist  TECHNIQUE: Routine multiplanar multisequence head MRI without and with intravenous contrast.    FINDINGS:  INTRACRANIAL CONTENTS: No acute or subacute infarct. No mass, acute hemorrhage, or extra-axial fluid collections. Normal brain parenchymal signal. Normal ventricles and sulci. Normal position of the cerebellar tonsils. No pathologic contrast enhancement.    SELLA: No abnormality accounting for technique.    OSSEOUS STRUCTURES/SOFT TISSUES: Normal marrow signal.  The major intracranial vascular flow voids are maintained.     ORBITS: No abnormality accounting for technique.     SINUSES/MASTOIDS: No paranasal sinus mucosal disease. No middle ear or mastoid effusion.       Impression    IMPRESSION:  1.  Normal head MRI.       CT Head w/o Contrast    Narrative    CT SCAN OF THE HEAD WITHOUT CONTRAST   2/1/2020 7:20 PM     HISTORY: Acute unresponsive post headache, eyes deviated up to right;  EEG revealed non-epileptic spells.    TECHNIQUE:  Axial images of the head and coronal reformations without  IV contrast material. Radiation dose for this scan was reduced using  automated exposure control, adjustment of the mA and/or kV according  to patient size, or iterative reconstruction technique.    COMPARISON: Brain MR 1/31/2020.    FINDINGS: There is no evidence of intracranial hemorrhage, mass, acute  infarct or anomaly. The ventricles are normal in size, shape and  configuration. The brain parenchyma and subarachnoid spaces are  normal.     The visualized portions of the sinuses and mastoids appear normal. The  bony calvarium and bones of the skull base appear intact.       Impression    IMPRESSION:   No evidence of acute intracranial hemorrhage, mass, or  herniation.    GISELLA MORALES MD           Most Recent Lab Results In EPIC (For Non-EPIC Providers):    Most Recent 3 CBC's:  Recent Labs   Lab Test 02/03/20  0754 02/02/20  0933 02/01/20  2252   WBC 3.6* 4.6 5.5   HGB 10.9* 11.0* 11.1*   MCV 72* 72* 73*   * 460* 466*      Most Recent 3 BMP's:  Recent Labs   Lab Test 02/03/20  0754 02/02/20  0933 02/01/20  2252    142 141   POTASSIUM 4.2 4.2 3.9   CHLORIDE 113* 116* 113*   CO2 21 20 24   BUN 7 8 11   CR 0.47* 0.48* 0.51*   ANIONGAP 5 6 4   ARIAS 8.5 8.3* 8.4*   * 118* 126*     Most Recent 3 Troponin's:No lab results found.    Invalid input(s): TROP, TROPONINIES  Most Recent 3 INR's:  Recent Labs   Lab Test 02/03/20  0754 02/02/20  0933   INR 1.02 1.04     Most  Recent 2 LFT's:  Recent Labs   Lab Test 01/31/20  1639 09/12/19  0002   AST 10 15   ALT 17 17   ALKPHOS 73 71   BILITOTAL 0.2 0.2     Most Recent Cholesterol Panel:No lab results found.  Most Recent 6 Bacteria Isolates From Any Culture (See EPIC Reports for Culture Details):  Recent Labs   Lab Test 09/11/19  2334   CULT 10,000 to 50,000 colonies/mL  mixed urogenital zeynep       Most Recent TSH, T4 and HgbA1c:   Recent Labs   Lab Test 01/31/20  1931   TSH 1.40

## 2020-02-03 NOTE — CONSULTS
Pt seen for new psychiatric consultation, see my dictation.  She appears to have returned to baseline. She is not interested in psych transfer, no basis for a hold. Providing support services such as counsling option seems appropriate. Symptoms of catatonia vs conversion disorder are considerations given response to ativan, and psych admission would be appropriate if presents again with these sx.    Brien Pearson MD

## 2020-02-03 NOTE — PROVIDER NOTIFICATION
Pt had another unresponsive episode about 5 minutes after receiving tylenol. VSS. Page sent to on call neurology.  Patient became alert after 30 minutes. Vitals stable throughout event. Per Dr. Butcher, no new orders at this time and continue to monitor.

## 2020-02-03 NOTE — CONSULTS
"Consult Date:  2020      REQUESTING PHYSICIAN:  Ronna Butcher MD.      REASON FOR CONSULTATION:  Nonepileptic events, depression, anxiety?      IDENTIFYING DATA:  The patient is a 35-year-old woman originally from [place], who was apparently found unresponsive at her workplace.  She was unresponsive in the ED for about 45 minutes with her eyes deviated to the right with no tonic-clonic activity.  Evaluation medically has been negative, she received a significant amount of Versed in the field and then got more Ativan and became more responsive.  She was given Keppra in the ED as well.  She is convalescing on station 33, initially having been somewhat delirious, but now her mental status has cleared.      CHIEF COMPLAINT:  \"I'm fine.\"      HISTORY OF PRESENT ILLNESS:  The patient is a 35-year-old woman originally from [place], she is a refugee and her  lives in [place].  I saw her this morning with her friend at bedside.  She has had an extensive workup here including imaging and a video EEG, none of which was showing any signs of seizures.  Over the last day or so, they have taken her off of Keppra.  They have some p.r.n.  Ativan written for and now she states she is \"doing fine.\"  When I came into the room, she was sitting up in bed, conversing with her friend.  She is oriented.  She speaks broken English, but is able articulate she is under stress because a friend at work .  She has a total of 9 children and certainly has a very busy life, also works in the community.  She has no previous psychiatric history.  She does not report thoughts of wanting to hurt self or others and is not showing any signs of agitation or psychosis at this time.      On further questioning, she does not endorse any signs of hypomania, noe, psychosis, generalized anxiety, panic, obsessive-compulsive history, eating disorder history, though there is mention she may have come from a traumatized background, having been a " "refugee coming out of [place].  Her  lives back there and so her support system is a bit sparse.  She has a friend watching over her children that range in ages 3-20.      PAST PSYCHIATRIC HISTORY:  Unremarkable.      PAST CHEMICAL DEPENDENCY HISTORY:  Negative.      PAST MEDICAL HISTORY:  Unremarkable.  She has a thyroid nodule on a previous CT scan, but otherwise things appear stable medically.      [id number] MEDICATIONS:  None.      FAMILY HISTORY:  Negative for mental illness, chemical dependency or suicide.      SOCIAL HISTORY:  The patient is , though her  is in [place] and she is apparently a refugee.  She speaks broken English.  She works full-time and has a large family of 9 children that she has been caring for.  She denies ever having difficulty caring for her children.  Denies any history of postpartum depression, though acknowledges some situational stress when she learned a friend at work had .      REVIEW OF SYSTEMS:  A 10-point review of systems is currently negative.      MOST RECENT VITAL SIGNS:  Temperature 99.1, pulse 82, respiratory rate 16, blood pressure 123/85, oxygen saturation 100%.      MENTAL STATUS EXAMINATION:  Appearance:  The patient is a dark-complected woman sitting up in her bed.  She wears a head scarf.  She is a fair historian.  Speech is notable for a thick  accent.  Use of language is appropriate.  Motor exam calm.  Coordination, station and gait within normal limits.  Muscle strength and tone adequate.  Affect is pleasant, somewhat passive.  Mood is \"okay.\"  Thought process logical, coherent and goal directed.  No loosening of associations, no flight of ideas.  No formal thought disorder on exam.  Thought content negative for hallucinations, delusions, paranoia, suicidal or homicidal ideation.  Insight and judgment are fair.  Cognitive exam:  The patient is alert and oriented x 3.  Concentration is intact.  Recent memory is poor.  Remote " memory is grossly intact.  General fund of knowledge is average.      IMPRESSION:  The patient is a 35-year-old woman who comes in with an unresponsive spell that seems consistent with potential catatonia.  Workup thus far does not show any evidence of a medical cause, identifiable seizure.  She responded to Ativan.  At this point, she seems to have returned to baseline.  It is certainly possible this could resurface, but she is wanting to discharge and I do not see a basis for a 72-hour hold.  She does not want to transfer to Psychiatry.  I wrote for a small dose of p.r.n.  Ativan.  She can be discharged when deemed medically stable.  It would be helpful to give her resources in the community for support, as she does have a good deal of external stress caring for many children and having a limited support system.      DIAGNOSES:   1. Adjustment reaction with depressed mood.   2. Rule out conversion disorder with nonepileptic events.   3. Rule out catatonia, unspecified.      PLAN:   1. Medical assessment and management as you are.   2. At this point, I do not see a clear reason for scheduled antipsychotic or other psychotropic meds.  She may have been somewhat delirious after getting large amounts of Versed and Ativan in the field, and this is clearing.   3. I wrote for some p.r.n. Ativan for anxiety in the short-term, additional resources should be provided for community support such as counseling if she accepts this, though she is resistant to that suggestion at the present time.   4. The patient can be discharged when deemed medically stable.  I do not see any basis for a 72-hour hold at this time.   5. Should she be readmitted under similar circumstances, showing catatonic-like behavior, it might be appropriate to admit her to Psychiatry for further evaluation of an underlying mood/psychotic disorder.            MARY YOUNG MD             D: 02/03/2020   T: 02/03/2020   MT: ANNIKA      Name:     JOEL ENRIQUEZ    MRN:      [id number]        Account:       [id number]   :      1985           Consult Date:  2020      Document: [id number]       cc: Ronna Butcher MD

## 2020-02-03 NOTE — PROGRESS NOTES
[facility] EEG #  [id number]  Day 1 started at 09:58.     Ordered by Dr. Clayton.   Video Observation initiated, patient informed. Awake and drowsy , eyes closed baseline performed.    Aicha Banks  Pt was able to consent to video EEG. Pt has concerns about duration of monitoring; Dr. Clayton will be following up further. with pt in the afternoon

## 2020-02-03 NOTE — PLAN OF CARE
Pt here with unresponsive episode likely new onset of seizure. Disoriented to situation. Lethargic. Arousal to voice. Follows command. Slow with speech. Seizure precaution. Had 1 episode of unresponsiveness this evening that lasted for 30 min. VSS. Tele ST. Regular  diet with thin liquids. Bedrest. Voiding using bedpan. Need to offer patient bedpan. Patient in her menstrual cycle. Pain decreased with tylenol. Pt scoring green on the Aggression Stop Light Tool. Plan continue to monitor. Psych and social consult tomorrow. Discharge pending.

## 2020-02-03 NOTE — PROGRESS NOTES
EXCUSE FROM WORK DUE TO ILLNESS      Ms. Indy Erazo was hospitalized due to acute medical illness. She can return to work without restrictions on 2/10/20    Felipa Parekh MD   hospitalist   [facility]  [phone]

## 2020-02-04 NOTE — PROCEDURES
Procedure Date: 2020      LONG-TERM ELECTROENCEPHALOGRAM WITH VIDEO      ORDERING PROVIDER:  Venice Chang MD      LT EEG # [id number]      LTV Day 1      This is a continuous video EEG performed on the standard [employer] 10-20 electrode system.  The awake recording shows mainly alpha frequency with a mild amount of intermixed delta.  It is symmetric.  There is a posterior dominant rhythm of 9 Hz bilaterally with quiet wakefulness and eye closure.  The patient did become drowsy with a dropout of posterior dominant rhythm and increased beta activity.  The patient did fall asleep with normal POSTS and alpha spindles.  No activating procedures were done.      IMPRESSION:  This is a normal continuous video EEG in the awake, drowsy and sleep states.  The patient elected to stop the EEG at the end of this recording.            VENICE CHANG MD             D: 2020   T: 2020   MT: TAVON      Name:     JOEL ENRIQUEZ   MRN:      [id number]        Account:        [id number]   :      1985           Procedure Date: 2020      Document: [id number]

## 2023-10-05 ENCOUNTER — HOSPITAL ENCOUNTER (EMERGENCY)
Facility: CLINIC | Age: 38
Discharge: HOME | End: 2023-10-06
Attending: EMERGENCY MEDICINE | Admitting: EMERGENCY MEDICINE
Payer: COMMERCIAL

## 2023-10-05 ENCOUNTER — APPOINTMENT (OUTPATIENT)
Dept: CT IMAGING | Facility: CLINIC | Age: 38
End: 2023-10-05
Attending: EMERGENCY MEDICINE
Payer: COMMERCIAL

## 2023-10-05 ENCOUNTER — APPOINTMENT (OUTPATIENT)
Dept: ULTRASOUND IMAGING | Facility: CLINIC | Age: 38
End: 2023-10-05
Attending: EMERGENCY MEDICINE
Payer: COMMERCIAL

## 2023-10-05 DIAGNOSIS — R55 SYNCOPE, UNSPECIFIED SYNCOPE TYPE: ICD-10-CM

## 2023-10-05 DIAGNOSIS — M79.2 NEUROPATHIC PAIN: ICD-10-CM

## 2023-10-05 LAB
ALBUMIN SERPL BCG-MCNC: 4.1 G/DL (ref 3.5–5.2)
ALBUMIN UR-MCNC: NEGATIVE MG/DL
ALP SERPL-CCNC: 99 U/L (ref 35–104)
ALT SERPL W P-5'-P-CCNC: 14 U/L (ref 0–50)
ANION GAP SERPL CALCULATED.3IONS-SCNC: 15 MMOL/L (ref 7–15)
APPEARANCE UR: CLEAR
AST SERPL W P-5'-P-CCNC: 11 U/L (ref 0–45)
BASO+EOS+MONOS # BLD AUTO: ABNORMAL 10*3/UL
BASO+EOS+MONOS NFR BLD AUTO: ABNORMAL %
BASOPHILS # BLD AUTO: 0 10E3/UL (ref 0–0.2)
BASOPHILS NFR BLD AUTO: 1 %
BILIRUB DIRECT SERPL-MCNC: <0.2 MG/DL (ref 0–0.3)
BILIRUB SERPL-MCNC: 0.2 MG/DL
BILIRUB UR QL STRIP: NEGATIVE
BUN SERPL-MCNC: 13 MG/DL (ref 6–20)
CALCIUM SERPL-MCNC: 8.6 MG/DL (ref 8.6–10)
CHLORIDE SERPL-SCNC: 103 MMOL/L (ref 98–107)
COLOR UR AUTO: ABNORMAL
CREAT SERPL-MCNC: 0.62 MG/DL (ref 0.51–0.95)
DEPRECATED HCO3 PLAS-SCNC: 19 MMOL/L (ref 22–29)
EGFRCR SERPLBLD CKD-EPI 2021: >90 ML/MIN/1.73M2
EOSINOPHIL # BLD AUTO: 0.1 10E3/UL (ref 0–0.7)
EOSINOPHIL NFR BLD AUTO: 2 %
ERYTHROCYTE [DISTWIDTH] IN BLOOD BY AUTOMATED COUNT: 14.6 % (ref 10–15)
FLUAV RNA SPEC QL NAA+PROBE: NEGATIVE
FLUBV RNA RESP QL NAA+PROBE: NEGATIVE
GLUCOSE SERPL-MCNC: 209 MG/DL (ref 70–99)
GLUCOSE UR STRIP-MCNC: >=1000 MG/DL
HCG SERPL QL: NEGATIVE
HCT VFR BLD AUTO: 36.6 % (ref 35–47)
HGB BLD-MCNC: 11.7 G/DL (ref 11.7–15.7)
HGB UR QL STRIP: NEGATIVE
IMM GRANULOCYTES # BLD: 0 10E3/UL
IMM GRANULOCYTES NFR BLD: 0 %
KETONES UR STRIP-MCNC: NEGATIVE MG/DL
LEUKOCYTE ESTERASE UR QL STRIP: NEGATIVE
LYMPHOCYTES # BLD AUTO: 3 10E3/UL (ref 0.8–5.3)
LYMPHOCYTES NFR BLD AUTO: 47 %
MAGNESIUM SERPL-MCNC: 2 MG/DL (ref 1.7–2.3)
MCH RBC QN AUTO: 24.6 PG (ref 26.5–33)
MCHC RBC AUTO-ENTMCNC: 32 G/DL (ref 31.5–36.5)
MCV RBC AUTO: 77 FL (ref 78–100)
MONOCYTES # BLD AUTO: 0.4 10E3/UL (ref 0–1.3)
MONOCYTES NFR BLD AUTO: 6 %
MUCOUS THREADS #/AREA URNS LPF: PRESENT /LPF
NEUTROPHILS # BLD AUTO: 2.8 10E3/UL (ref 1.6–8.3)
NEUTROPHILS NFR BLD AUTO: 44 %
NITRATE UR QL: NEGATIVE
NRBC # BLD AUTO: 0 10E3/UL
NRBC BLD AUTO-RTO: 0 /100
NT-PROBNP SERPL-MCNC: <36 PG/ML (ref 0–450)
PH UR STRIP: 5.5 [PH] (ref 5–7)
PLATELET # BLD AUTO: 405 10E3/UL (ref 150–450)
POTASSIUM SERPL-SCNC: 3.6 MMOL/L (ref 3.4–5.3)
PROT SERPL-MCNC: 7.2 G/DL (ref 6.4–8.3)
RBC # BLD AUTO: 4.75 10E6/UL (ref 3.8–5.2)
RBC URINE: 1 /HPF
RSV RNA SPEC NAA+PROBE: NEGATIVE
SARS-COV-2 RNA RESP QL NAA+PROBE: NEGATIVE
SODIUM SERPL-SCNC: 137 MMOL/L (ref 135–145)
SP GR UR STRIP: 1.03 (ref 1–1.03)
SQUAMOUS EPITHELIAL: <1 /HPF
TROPONIN T SERPL HS-MCNC: <6 NG/L
TSH SERPL DL<=0.005 MIU/L-ACNC: 2.4 UIU/ML (ref 0.3–4.2)
UROBILINOGEN UR STRIP-MCNC: NORMAL MG/DL
WBC # BLD AUTO: 6.3 10E3/UL (ref 4–11)
WBC URINE: <1 /HPF

## 2023-10-05 PROCEDURE — 36415 COLL VENOUS BLD VENIPUNCTURE: CPT | Performed by: EMERGENCY MEDICINE

## 2023-10-05 PROCEDURE — 82374 ASSAY BLOOD CARBON DIOXIDE: CPT | Performed by: EMERGENCY MEDICINE

## 2023-10-05 PROCEDURE — 83880 ASSAY OF NATRIURETIC PEPTIDE: CPT | Performed by: EMERGENCY MEDICINE

## 2023-10-05 PROCEDURE — 71275 CT ANGIOGRAPHY CHEST: CPT

## 2023-10-05 PROCEDURE — 250N000011 HC RX IP 250 OP 636: Performed by: EMERGENCY MEDICINE

## 2023-10-05 PROCEDURE — 93971 EXTREMITY STUDY: CPT | Mod: RT

## 2023-10-05 PROCEDURE — 250N000009 HC RX 250: Performed by: EMERGENCY MEDICINE

## 2023-10-05 PROCEDURE — 85025 COMPLETE CBC W/AUTO DIFF WBC: CPT | Performed by: EMERGENCY MEDICINE

## 2023-10-05 PROCEDURE — 87637 SARSCOV2&INF A&B&RSV AMP PRB: CPT | Performed by: EMERGENCY MEDICINE

## 2023-10-05 PROCEDURE — 83735 ASSAY OF MAGNESIUM: CPT | Performed by: EMERGENCY MEDICINE

## 2023-10-05 PROCEDURE — 99285 EMERGENCY DEPT VISIT HI MDM: CPT | Mod: 25

## 2023-10-05 PROCEDURE — 82248 BILIRUBIN DIRECT: CPT | Performed by: EMERGENCY MEDICINE

## 2023-10-05 PROCEDURE — 84703 CHORIONIC GONADOTROPIN ASSAY: CPT | Performed by: EMERGENCY MEDICINE

## 2023-10-05 PROCEDURE — 81001 URINALYSIS AUTO W/SCOPE: CPT | Performed by: EMERGENCY MEDICINE

## 2023-10-05 PROCEDURE — 84443 ASSAY THYROID STIM HORMONE: CPT | Performed by: EMERGENCY MEDICINE

## 2023-10-05 PROCEDURE — 93005 ELECTROCARDIOGRAM TRACING: CPT

## 2023-10-05 PROCEDURE — 84484 ASSAY OF TROPONIN QUANT: CPT | Performed by: EMERGENCY MEDICINE

## 2023-10-05 RX ORDER — IOPAMIDOL 755 MG/ML
72 INJECTION, SOLUTION INTRAVASCULAR ONCE
Status: COMPLETED | OUTPATIENT
Start: 2023-10-05 | End: 2023-10-05

## 2023-10-05 RX ADMIN — IOPAMIDOL 72 ML: 755 INJECTION, SOLUTION INTRAVENOUS at 23:54

## 2023-10-05 RX ADMIN — SODIUM CHLORIDE 96 ML: 9 INJECTION, SOLUTION INTRAVENOUS at 23:54

## 2023-10-05 ASSESSMENT — ACTIVITIES OF DAILY LIVING (ADL): ADLS_ACUITY_SCORE: 35

## 2023-10-06 VITALS
SYSTOLIC BLOOD PRESSURE: 101 MMHG | TEMPERATURE: 97.8 F | DIASTOLIC BLOOD PRESSURE: 71 MMHG | OXYGEN SATURATION: 100 % | HEART RATE: 84 BPM | RESPIRATION RATE: 20 BRPM

## 2023-10-06 LAB
ATRIAL RATE - MUSE: 82 BPM
DIASTOLIC BLOOD PRESSURE - MUSE: NORMAL MMHG
INTERPRETATION ECG - MUSE: NORMAL
P AXIS - MUSE: 24 DEGREES
PR INTERVAL - MUSE: 156 MS
QRS DURATION - MUSE: 86 MS
QT - MUSE: 390 MS
QTC - MUSE: 455 MS
R AXIS - MUSE: -23 DEGREES
SYSTOLIC BLOOD PRESSURE - MUSE: NORMAL MMHG
T AXIS - MUSE: -3 DEGREES
VENTRICULAR RATE- MUSE: 82 BPM

## 2023-10-06 RX ORDER — GABAPENTIN 100 MG/1
100 CAPSULE ORAL 3 TIMES DAILY
Qty: 90 CAPSULE | Refills: 0 | Status: SHIPPED | OUTPATIENT
Start: 2023-10-06

## 2023-10-06 NOTE — ED NOTES
Bed: ED14  Expected date: 10/5/23  Expected time: 9:05 PM  Means of arrival: Ambulance  Comments:  Berna Conn 40F syncope

## 2023-10-06 NOTE — ED PROVIDER NOTES
History     Chief Complaint:  Syncope       HPI   Indy Erazo is a 38 year old female with a history of diabetes who presents to the ED with syncope. Patient arrives via EMS after shortness of breath all day and syncopal episode tonight. Patient was unconscious for 10 seconds and PD did CPR. Last period was a couple weeks ago. Says no chance of pregnancy as far as she is aware. Patient reports having right leg pain for 2-3 months that she has been trying to see someone for but hasn't yet. Reports feeling fine yesterday. Denies chest pain.     Telephone  used per patient request.    Independent Historian:   None - Patient Only    Review of External Notes:   None    Medications:    Tramadol  Tizanidine   Januvia  Naproxen  Metformin  Mefloquine   Glipizide  Ferrous sulfate  Jardiance  Flexeril   Azithromycin     Past Medical History:    Diabetes  Anemia   Illiterate   Liver hemangioma   Cervicalgia   Obese   Vitamin D deficiency     Past Surgical History:    Thyroidectomy     Physical Exam   Patient Vitals for the past 24 hrs:   BP Temp Temp src Pulse Resp SpO2   10/05/23 2236 116/78 -- -- 84 -- 100 %   10/05/23 2201 -- -- -- 89 -- 100 %   10/05/23 2120 109/58 97.8  F (36.6  C) Oral 86 20 100 %      Physical Exam  General: Laying on the ED bed  HEENT: Normocephalic, atraumatic  Cardiac: Radial pulses 2+, regular rate and rhythm  Pulm: Breathing comfortably, no accessory muscle usage, no conversational dyspnea, and lungs clear bilaterally  GI: Abdomen soft, nontender, no rigidity or guarding  MSK: No bony deformities, right calf tenderness present  Skin: Warm and dry  Neuro: Moves all extremities  Psych: Pleasant mood and affect      Emergency Department Course   ECG  ECG taken at 2125, ECG read at 2129  Normal sinus rhythm   Nonspecific T wave abnormality   Abnormal ECG    New TWI V3 as compared to prior, dated 1/31/20.  Rate 82 bpm. TN interval 156 ms. QRS duration 86 ms. QT/QTc 390/455 ms. P-R-T axes 24  -23 -3.     Imaging:  CT Chest Pulmonary Embolism w Contrast   Final Result   IMPRESSION:   1.  Negative for pulmonary embolism.      2.  No evidence of pneumonia. No pleural effusion.      US Lower Extremity Venous Duplex Right   Final Result   IMPRESSION:   1.  No deep venous thrombosis in the right lower extremity.         Report per radiology    Laboratory:  Labs Ordered and Resulted from Time of ED Arrival to Time of ED Departure   BASIC METABOLIC PANEL - Abnormal       Result Value    Sodium 137      Potassium 3.6      Chloride 103      Carbon Dioxide (CO2) 19 (*)     Anion Gap 15      Urea Nitrogen 13.0      Creatinine 0.62      GFR Estimate >90      Calcium 8.6      Glucose 209 (*)    ROUTINE UA WITH MICROSCOPIC REFLEX TO CULTURE - Abnormal    Color Urine Light Yellow      Appearance Urine Clear      Glucose Urine >=1000 (*)     Bilirubin Urine Negative      Ketones Urine Negative      Specific Gravity Urine 1.028      Blood Urine Negative      pH Urine 5.5      Protein Albumin Urine Negative      Urobilinogen Urine Normal      Nitrite Urine Negative      Leukocyte Esterase Urine Negative      Mucus Urine Present (*)     RBC Urine 1      WBC Urine <1      Squamous Epithelials Urine <1     CBC WITH PLATELETS AND DIFFERENTIAL - Abnormal    WBC Count 6.3      RBC Count 4.75      Hemoglobin 11.7      Hematocrit 36.6      MCV 77 (*)     MCH 24.6 (*)     MCHC 32.0      RDW 14.6      Platelet Count 405      % Neutrophils 44      % Lymphocytes 47      % Monocytes 6      Mids % (Monos, Eos, Basos)        % Eosinophils 2      % Basophils 1      % Immature Granulocytes 0      NRBCs per 100 WBC 0      Absolute Neutrophils 2.8      Absolute Lymphocytes 3.0      Absolute Monocytes 0.4      Mids Abs (Monos, Eos, Basos)        Absolute Eosinophils 0.1      Absolute Basophils 0.0      Absolute Immature Granulocytes 0.0      Absolute NRBCs 0.0     TROPONIN T, HIGH SENSITIVITY - Normal    Troponin T, High Sensitivity <6      MAGNESIUM - Normal    Magnesium 2.0     TSH WITH FREE T4 REFLEX - Normal    TSH 2.40     HCG QUALITATIVE PREGNANCY - Normal    hCG Serum Qualitative Negative     NT PROBNP INPATIENT - Normal    N terminal Pro BNP Inpatient <36     INFLUENZA A/B, RSV, & SARS-COV2 PCR - Normal    Influenza A PCR Negative      Influenza B PCR Negative      RSV PCR Negative      SARS CoV2 PCR Negative     HEPATIC FUNCTION PANEL - Normal    Protein Total 7.2      Albumin 4.1      Bilirubin Total 0.2      Alkaline Phosphatase 99      AST 11      ALT 14      Bilirubin Direct <0.20        Procedures   None    Emergency Department Course & Assessments:       Interventions:  Medications   iopamidol (ISOVUE-370) solution 72 mL (72 mLs Intravenous $Given 10/5/23 2354)   Saline Flush (96 mLs Intravenous $Given 10/5/23 2354)        Assessments:   I obtained history and examined the patient as noted above.     Independent Interpretation (X-rays, CTs, rhythm strip):  None    Consultations/Discussion of Management or Tests:  None       Social Determinants of Health affecting care:   None    Disposition:  The patient was discharged to home.     Impression & Plan    CMS Diagnoses: None    Medical Decision Makin-year-old female presents with a syncopal episode at work today in the context of feeling dyspneic for the entire day. She did receive CPR, although unclear whether there was a pulse check to confirm it was indicated as the patient reportedly regained consciousness in a matter of seconds. She also reports 2 months of right lower extremity pain. Overall picture is initially concerning for PE/DVT. EKG is nonischemic. Lab work here shows overall no acute findings. Imaging shows no DVT and no PE. The patient remained stable in the ED. I suspect that she received an appropriate CPR due to a brief syncopal episode.  Upon reevaluation, the patient states that her right lower extremity pain is more neuropathic in nature.  Denies back pain,  no signs or symptoms of cauda equina.  With that in mind, plan is for discharge home with a trial of gabapentin and PCP follow-up for further care.  Patient is in agreement with that plan.    Diagnosis:    ICD-10-CM    1. Syncope, unspecified syncope type  R55       2. Neuropathic pain  M79.2            Discharge Medications:  New Prescriptions    GABAPENTIN (NEURONTIN) 100 MG CAPSULE    Take 1 capsule (100 mg) by mouth 3 times daily      Scribe Disclosure:  Pauline ZARAGOZA, am serving as a scribe at 9:39 PM on 10/5/2023 to document services personally performed by Luis Lyon MD based on my observations and the provider's statements to me.     10/5/2023   Luis Lyon MD King, Colin, MD  10/06/23 0045

## 2023-10-06 NOTE — ED TRIAGE NOTES
SOB all day, coming from work, had syncope and was lowered to ground and had 10 seconds of CPR by PD after pt went unconscious-then medics arrived and pt was awake.Pt ass diabetes, glucose 253. Also has hx of right hip pain - chronic.